# Patient Record
Sex: MALE | Race: WHITE | NOT HISPANIC OR LATINO | Employment: OTHER | URBAN - METROPOLITAN AREA
[De-identification: names, ages, dates, MRNs, and addresses within clinical notes are randomized per-mention and may not be internally consistent; named-entity substitution may affect disease eponyms.]

---

## 2017-08-10 RX ORDER — LOVASTATIN 40 MG/1
TABLET ORAL
Qty: 30 TABLET | Refills: 0 | Status: SHIPPED | OUTPATIENT
Start: 2017-08-10 | End: 2019-01-14

## 2017-08-20 DIAGNOSIS — G43.909 MIGRAINE WITHOUT STATUS MIGRAINOSUS, NOT INTRACTABLE, UNSPECIFIED MIGRAINE TYPE: Primary | ICD-10-CM

## 2017-08-21 RX ORDER — SUMATRIPTAN 100 MG/1
TABLET, FILM COATED ORAL
Qty: 15 TABLET | Refills: 1 | Status: SHIPPED | OUTPATIENT
Start: 2017-08-21 | End: 2018-01-31 | Stop reason: SDUPTHER

## 2017-09-18 RX ORDER — LOVASTATIN 40 MG/1
TABLET ORAL
Qty: 30 TABLET | Refills: 0 | OUTPATIENT
Start: 2017-09-18

## 2018-01-31 DIAGNOSIS — G43.909 MIGRAINE WITHOUT STATUS MIGRAINOSUS, NOT INTRACTABLE, UNSPECIFIED MIGRAINE TYPE: ICD-10-CM

## 2018-01-31 RX ORDER — SUMATRIPTAN 100 MG/1
100 TABLET, FILM COATED ORAL ONCE AS NEEDED
Qty: 9 TABLET | Refills: 2 | Status: SHIPPED | OUTPATIENT
Start: 2018-01-31 | End: 2018-09-12 | Stop reason: SDUPTHER

## 2018-01-31 NOTE — TELEPHONE ENCOUNTER
Fax received from [facility] stating Insurance will only cover 9 tab, med resent for 9 tab w 2 refills

## 2018-03-27 ENCOUNTER — OFFICE VISIT (OUTPATIENT)
Dept: ORTHOPEDIC SURGERY | Facility: CLINIC | Age: 68
End: 2018-03-27

## 2018-03-27 VITALS — TEMPERATURE: 98.1 F | BODY MASS INDEX: 27.77 KG/M2 | HEIGHT: 72 IN | WEIGHT: 205 LBS

## 2018-03-27 DIAGNOSIS — IMO0002 BURSITIS/TENDONITIS, SHOULDER: ICD-10-CM

## 2018-03-27 DIAGNOSIS — M25.511 RIGHT SHOULDER PAIN, UNSPECIFIED CHRONICITY: Primary | ICD-10-CM

## 2018-03-27 PROCEDURE — 73030 X-RAY EXAM OF SHOULDER: CPT | Performed by: ORTHOPAEDIC SURGERY

## 2018-03-27 PROCEDURE — 99203 OFFICE O/P NEW LOW 30 MIN: CPT | Performed by: ORTHOPAEDIC SURGERY

## 2018-03-27 RX ORDER — ETODOLAC 400 MG/1
400 TABLET, FILM COATED ORAL 2 TIMES DAILY PRN
Qty: 60 TABLET | Refills: 0 | Status: SHIPPED | OUTPATIENT
Start: 2018-03-27 | End: 2019-01-14

## 2018-03-30 ENCOUNTER — TELEPHONE (OUTPATIENT)
Dept: ORTHOPEDIC SURGERY | Facility: CLINIC | Age: 68
End: 2018-03-30

## 2018-04-02 PROBLEM — IMO0002 BURSITIS/TENDONITIS, SHOULDER: Status: ACTIVE | Noted: 2018-04-02

## 2018-09-02 DIAGNOSIS — G43.909 MIGRAINE WITHOUT STATUS MIGRAINOSUS, NOT INTRACTABLE, UNSPECIFIED MIGRAINE TYPE: ICD-10-CM

## 2018-09-04 ENCOUNTER — TELEPHONE (OUTPATIENT)
Dept: INTERNAL MEDICINE | Facility: CLINIC | Age: 68
End: 2018-09-04

## 2018-09-04 RX ORDER — SUMATRIPTAN 100 MG/1
TABLET, FILM COATED ORAL
Qty: 9 TABLET | Refills: 2 | OUTPATIENT
Start: 2018-09-04

## 2018-09-04 NOTE — TELEPHONE ENCOUNTER
Called and left voicemail informing patient we hasn't been seen in the office since 2016 so we couldn't fill his Imitrex at this time.

## 2018-09-04 NOTE — TELEPHONE ENCOUNTER
----- Message from Kristal Stewart sent at 9/4/2018 11:38 AM EDT -----  Contact: pt  Pt is calling for a refill     FOR  SUMAtriptan (IMITREX) 100 MG tablet      Patient requests RX SENT TO   [address] [facility] [address] [facility] [phone] [facility] [phone] FX      Caller#     Please and thank you.

## 2018-09-12 DIAGNOSIS — G43.909 MIGRAINE WITHOUT STATUS MIGRAINOSUS, NOT INTRACTABLE, UNSPECIFIED MIGRAINE TYPE: ICD-10-CM

## 2018-09-12 RX ORDER — SUMATRIPTAN 100 MG/1
100 TABLET, FILM COATED ORAL ONCE AS NEEDED
Qty: 9 TABLET | Refills: 4 | Status: SHIPPED | OUTPATIENT
Start: 2018-09-12

## 2019-01-14 ENCOUNTER — OFFICE VISIT (OUTPATIENT)
Dept: INTERNAL MEDICINE | Facility: CLINIC | Age: 69
End: 2019-01-14

## 2019-01-14 VITALS
OXYGEN SATURATION: 93 % | DIASTOLIC BLOOD PRESSURE: 68 MMHG | HEART RATE: 62 BPM | BODY MASS INDEX: 28.12 KG/M2 | SYSTOLIC BLOOD PRESSURE: 103 MMHG | HEIGHT: 72 IN | WEIGHT: 207.6 LBS | TEMPERATURE: 98.9 F

## 2019-01-14 DIAGNOSIS — N13.8 BENIGN PROSTATIC HYPERPLASIA WITH URINARY OBSTRUCTION: ICD-10-CM

## 2019-01-14 DIAGNOSIS — R68.89 HEAT INTOLERANCE: ICD-10-CM

## 2019-01-14 DIAGNOSIS — E78.00 ELEVATED CHOLESTEROL: ICD-10-CM

## 2019-01-14 DIAGNOSIS — N40.1 BENIGN PROSTATIC HYPERPLASIA WITH URINARY OBSTRUCTION: ICD-10-CM

## 2019-01-14 DIAGNOSIS — G43.009 MIGRAINE WITHOUT AURA AND WITHOUT STATUS MIGRAINOSUS, NOT INTRACTABLE: Primary | ICD-10-CM

## 2019-01-14 DIAGNOSIS — J38.1 POLYP OF VOCAL CORD: ICD-10-CM

## 2019-01-14 PROCEDURE — 99214 OFFICE O/P EST MOD 30 MIN: CPT | Performed by: FAMILY MEDICINE

## 2019-01-14 RX ORDER — LOVASTATIN 40 MG/1
40 TABLET ORAL DAILY
Qty: 90 TABLET | Refills: 3 | Status: SHIPPED | OUTPATIENT
Start: 2019-01-14 | End: 2020-01-15

## 2019-01-14 RX ORDER — UBIDECARENONE 100 MG
100 CAPSULE ORAL DAILY
COMMUNITY
End: 2019-08-14

## 2019-01-14 RX ORDER — MAGNESIUM GLUCONATE 27 MG(500)
27 TABLET ORAL 2 TIMES DAILY
COMMUNITY

## 2019-01-14 NOTE — PROGRESS NOTES
Subjective   Heriberto Macario Jr. is a 68 y.o. male.     Chief Complaint   Patient presents with   • Hyperlipidemia   • Headache   Vocal cord polyp      History of Present Illness   Delightful gentleman with a history of treatment of hyperlipidemia with lovastatin.  He's been out of his medicine for a while.  Otherwise is a question of prostate symptoms and we discussed getting a PSA.  He has had a history of vocal cord polyp seen by ENT in the past.  He is a former smoker.  We discussed getting labs today.      The following portions of the patient's history were reviewed and updated as appropriate: allergies, current medications, past social history and problem list.    Review of Systems   Constitutional: Negative.    HENT: Negative.    Eyes: Negative.    Respiratory: Negative.    Cardiovascular: Negative.    Gastrointestinal: Negative.    Endocrine: Negative.    Genitourinary: Negative.    Musculoskeletal: Negative.    Skin: Negative.    Allergic/Immunologic: Negative.    Neurological: Negative.    Hematological: Negative.    Psychiatric/Behavioral: Negative.        Objective   Vitals:    01/14/19 1119   BP: 103/68   Pulse: 62   Temp: 98.9 °F (37.2 °C)   SpO2: 93%     Physical Exam   Constitutional: He is oriented to person, place, and time. He appears well-developed and well-nourished.   HENT:   Head: Normocephalic and atraumatic.   Right Ear: Tympanic membrane and external ear normal.   Left Ear: Tympanic membrane and external ear normal.   Nose: Nose normal.   Mouth/Throat: Oropharynx is clear and moist.   Chronic hoarseness   Eyes: Conjunctivae and EOM are normal. Pupils are equal, round, and reactive to light.   Neck: Normal range of motion. Neck supple. No JVD present. No thyromegaly present.   Cardiovascular: Normal rate, regular rhythm, normal heart sounds and intact distal pulses.   Pulmonary/Chest: Effort normal and breath sounds normal.   Abdominal: Soft. Bowel sounds are normal.   Musculoskeletal: Normal  range of motion.   Lymphadenopathy:     He has no cervical adenopathy.   Neurological: He is alert and oriented to person, place, and time. No cranial nerve deficit. Coordination normal.   Skin: Skin is warm and dry. No rash noted.   Psychiatric: He has a normal mood and affect. His behavior is normal. Judgment and thought content normal.   Vitals reviewed.      Assessment/Plan   Problem List Items Addressed This Visit        Cardiovascular and Mediastinum    Elevated cholesterol    Relevant Medications    lovastatin (MEVACOR) 40 MG tablet    Other Relevant Orders    CBC & Differential    Comprehensive Metabolic Panel    Lipid Panel With / Chol / HDL Ratio    Urinalysis With Microscopic If Indicated (No Culture) - Urine, Clean Catch    TSH    T4, Free    CBC & Differential    Comprehensive Metabolic Panel    Lipid Panel With / Chol / HDL Ratio    Urinalysis With Microscopic If Indicated (No Culture) - Urine, Clean Catch    Migraine without status migrainosus, not intractable - Primary    Relevant Orders    CBC & Differential    Comprehensive Metabolic Panel    Lipid Panel With / Chol / HDL Ratio    Urinalysis With Microscopic If Indicated (No Culture) - Urine, Clean Catch    TSH    T4, Free    CBC & Differential    Comprehensive Metabolic Panel    Lipid Panel With / Chol / HDL Ratio    Urinalysis With Microscopic If Indicated (No Culture) - Urine, Clean Catch       Respiratory    Polyp of vocal cord    Relevant Orders    CBC & Differential    Comprehensive Metabolic Panel    Lipid Panel With / Chol / HDL Ratio    Urinalysis With Microscopic If Indicated (No Culture) - Urine, Clean Catch    TSH    T4, Free       Genitourinary    Benign prostatic hyperplasia with urinary obstruction    Relevant Orders    CBC & Differential    Comprehensive Metabolic Panel    Lipid Panel With / Chol / HDL Ratio    PSA, Total & Free    Urinalysis With Microscopic If Indicated (No Culture) - Urine, Clean Catch    TSH    T4, Free       Other Visit Diagnoses     Heat intolerance        Relevant Orders    CBC & Differential    Comprehensive Metabolic Panel    Lipid Panel With / Chol / HDL Ratio    Urinalysis With Microscopic If Indicated (No Culture) - Urine, Clean Catch    TSH    T4, Free    CBC & Differential    Comprehensive Metabolic Panel    Lipid Panel With / Chol / HDL Ratio    Urinalysis With Microscopic If Indicated (No Culture) - Urine, Clean Catch      Plan: Screening labs including PSA.  Follow-up in about 6 months for wellness exam.  Restart lovastatin 40 mg daily.  He sees neurology for migraine headaches.  Consider referral to ENT for vocal cord polyp.

## 2019-01-15 LAB
ALBUMIN SERPL-MCNC: 4.8 G/DL (ref 3.5–5.2)
ALBUMIN/GLOB SERPL: 1.8 G/DL
ALP SERPL-CCNC: 59 U/L (ref 39–117)
ALT SERPL-CCNC: 21 U/L (ref 1–41)
APPEARANCE UR: CLEAR
AST SERPL-CCNC: 21 U/L (ref 1–40)
BACTERIA #/AREA URNS HPF: ABNORMAL /HPF
BASOPHILS # BLD AUTO: 0.05 10*3/MM3 (ref 0–0.2)
BASOPHILS NFR BLD AUTO: 0.6 % (ref 0–1.5)
BILIRUB SERPL-MCNC: 0.4 MG/DL (ref 0.1–1.2)
BILIRUB UR QL STRIP: NEGATIVE
BUN SERPL-MCNC: 13 MG/DL (ref 8–23)
BUN/CREAT SERPL: 14.3 (ref 7–25)
CALCIUM SERPL-MCNC: 9.9 MG/DL (ref 8.6–10.5)
CASTS URNS MICRO: ABNORMAL
CHLORIDE SERPL-SCNC: 101 MMOL/L (ref 98–107)
CHOLEST SERPL-MCNC: 257 MG/DL (ref 0–200)
CHOLEST/HDLC SERPL: 5.35 {RATIO}
CO2 SERPL-SCNC: 27.7 MMOL/L (ref 22–29)
COLOR UR: ABNORMAL
CREAT SERPL-MCNC: 0.91 MG/DL (ref 0.76–1.27)
EOSINOPHIL # BLD AUTO: 0.51 10*3/MM3 (ref 0–0.7)
EOSINOPHIL NFR BLD AUTO: 6.2 % (ref 0.3–6.2)
EPI CELLS #/AREA URNS HPF: ABNORMAL /HPF
ERYTHROCYTE [DISTWIDTH] IN BLOOD BY AUTOMATED COUNT: 13.7 % (ref 11.5–14.5)
GLOBULIN SER CALC-MCNC: 2.7 GM/DL
GLUCOSE SERPL-MCNC: 89 MG/DL (ref 65–99)
GLUCOSE UR QL: NEGATIVE
HCT VFR BLD AUTO: 49.8 % (ref 40.4–52.2)
HDLC SERPL-MCNC: 48 MG/DL (ref 40–60)
HGB BLD-MCNC: 15.4 G/DL (ref 13.7–17.6)
HGB UR QL STRIP: NEGATIVE
IMM GRANULOCYTES # BLD AUTO: 0 10*3/MM3 (ref 0–0.03)
IMM GRANULOCYTES NFR BLD AUTO: 0 % (ref 0–0.5)
KETONES UR QL STRIP: ABNORMAL
LDLC SERPL CALC-MCNC: 164 MG/DL (ref 0–100)
LEUKOCYTE ESTERASE UR QL STRIP: ABNORMAL
LYMPHOCYTES # BLD AUTO: 2.8 10*3/MM3 (ref 0.9–4.8)
LYMPHOCYTES NFR BLD AUTO: 34.1 % (ref 19.6–45.3)
MCH RBC QN AUTO: 30.6 PG (ref 27–32.7)
MCHC RBC AUTO-ENTMCNC: 30.9 G/DL (ref 32.6–36.4)
MCV RBC AUTO: 98.8 FL (ref 79.8–96.2)
MONOCYTES # BLD AUTO: 0.6 10*3/MM3 (ref 0.2–1.2)
MONOCYTES NFR BLD AUTO: 7.3 % (ref 5–12)
NEUTROPHILS # BLD AUTO: 4.24 10*3/MM3 (ref 1.9–8.1)
NEUTROPHILS NFR BLD AUTO: 51.8 % (ref 42.7–76)
NITRITE UR QL STRIP: NEGATIVE
PH UR STRIP: 6 [PH] (ref 5–8)
PLATELET # BLD AUTO: 324 10*3/MM3 (ref 140–500)
POTASSIUM SERPL-SCNC: 5.1 MMOL/L (ref 3.5–5.2)
PROT SERPL-MCNC: 7.5 G/DL (ref 6–8.5)
PROT UR QL STRIP: NEGATIVE
PSA FREE MFR SERPL: 20.8 %
PSA FREE SERPL-MCNC: 0.27 NG/ML
PSA SERPL-MCNC: 1.3 NG/ML (ref 0–4)
RBC # BLD AUTO: 5.04 10*6/MM3 (ref 4.6–6)
RBC #/AREA URNS HPF: ABNORMAL /HPF
SODIUM SERPL-SCNC: 142 MMOL/L (ref 136–145)
SP GR UR: 1.03 (ref 1–1.03)
T4 FREE SERPL-MCNC: 1.08 NG/DL (ref 0.93–1.7)
TRIGL SERPL-MCNC: 225 MG/DL (ref 0–150)
TSH SERPL DL<=0.005 MIU/L-ACNC: 2.17 MIU/ML (ref 0.27–4.2)
UROBILINOGEN UR STRIP-MCNC: ABNORMAL MG/DL
VLDLC SERPL CALC-MCNC: 45 MG/DL (ref 5–40)
WBC # BLD AUTO: 8.2 10*3/MM3 (ref 4.5–10.7)
WBC #/AREA URNS HPF: ABNORMAL /HPF

## 2019-03-20 LAB
APPEARANCE UR: CLEAR
BILIRUB UR QL STRIP: NEGATIVE
COLOR UR: YELLOW
GLUCOSE UR QL: NEGATIVE
HGB UR QL STRIP: NEGATIVE
KETONES UR QL STRIP: NEGATIVE
LEUKOCYTE ESTERASE UR QL STRIP: NEGATIVE
NITRITE UR QL STRIP: NEGATIVE
PH UR STRIP: 5.5 [PH] (ref 5–8)
PROT UR QL STRIP: NEGATIVE
SP GR UR: 1.02 (ref 1–1.03)
UROBILINOGEN UR STRIP-MCNC: NORMAL MG/DL

## 2019-05-09 ENCOUNTER — TELEPHONE (OUTPATIENT)
Dept: INTERNAL MEDICINE | Facility: CLINIC | Age: 69
End: 2019-05-09

## 2019-05-09 DIAGNOSIS — M54.40 LOW BACK PAIN WITH SCIATICA, SCIATICA LATERALITY UNSPECIFIED, UNSPECIFIED BACK PAIN LATERALITY, UNSPECIFIED CHRONICITY: Primary | ICD-10-CM

## 2019-05-09 NOTE — TELEPHONE ENCOUNTER
The pt contacted them requesting PT for low back and sciatica pain  They need an order sent and an insurance referral  Order placed

## 2019-07-14 ENCOUNTER — RESULTS ENCOUNTER (OUTPATIENT)
Dept: INTERNAL MEDICINE | Facility: CLINIC | Age: 69
End: 2019-07-14

## 2019-07-14 DIAGNOSIS — R68.89 HEAT INTOLERANCE: ICD-10-CM

## 2019-07-14 DIAGNOSIS — E78.00 ELEVATED CHOLESTEROL: ICD-10-CM

## 2019-07-14 DIAGNOSIS — G43.009 MIGRAINE WITHOUT AURA AND WITHOUT STATUS MIGRAINOSUS, NOT INTRACTABLE: ICD-10-CM

## 2019-08-14 ENCOUNTER — OFFICE VISIT (OUTPATIENT)
Dept: INTERNAL MEDICINE | Facility: CLINIC | Age: 69
End: 2019-08-14

## 2019-08-14 VITALS
DIASTOLIC BLOOD PRESSURE: 75 MMHG | TEMPERATURE: 97.7 F | HEART RATE: 79 BPM | BODY MASS INDEX: 27.5 KG/M2 | HEIGHT: 72 IN | WEIGHT: 203 LBS | SYSTOLIC BLOOD PRESSURE: 116 MMHG | OXYGEN SATURATION: 95 %

## 2019-08-14 DIAGNOSIS — G43.009 MIGRAINE WITHOUT AURA AND WITHOUT STATUS MIGRAINOSUS, NOT INTRACTABLE: ICD-10-CM

## 2019-08-14 DIAGNOSIS — N13.8 BENIGN PROSTATIC HYPERPLASIA WITH URINARY OBSTRUCTION: ICD-10-CM

## 2019-08-14 DIAGNOSIS — N40.1 BENIGN PROSTATIC HYPERPLASIA WITH URINARY OBSTRUCTION: ICD-10-CM

## 2019-08-14 DIAGNOSIS — E78.00 ELEVATED CHOLESTEROL: Primary | ICD-10-CM

## 2019-08-14 LAB
ALBUMIN SERPL-MCNC: 4.8 G/DL (ref 3.5–5.2)
ALBUMIN/GLOB SERPL: 1.9 G/DL
ALP SERPL-CCNC: 71 U/L (ref 39–117)
ALT SERPL-CCNC: 27 U/L (ref 1–41)
AST SERPL-CCNC: 21 U/L (ref 1–40)
BILIRUB SERPL-MCNC: 0.4 MG/DL (ref 0.2–1.2)
BUN SERPL-MCNC: 11 MG/DL (ref 8–23)
BUN/CREAT SERPL: 12.4 (ref 7–25)
CALCIUM SERPL-MCNC: 9.8 MG/DL (ref 8.6–10.5)
CHLORIDE SERPL-SCNC: 101 MMOL/L (ref 98–107)
CHOLEST SERPL-MCNC: 196 MG/DL (ref 0–200)
CO2 SERPL-SCNC: 26.2 MMOL/L (ref 22–29)
CREAT SERPL-MCNC: 0.89 MG/DL (ref 0.76–1.27)
ERYTHROCYTE [DISTWIDTH] IN BLOOD BY AUTOMATED COUNT: 13 % (ref 12.3–15.4)
GLOBULIN SER CALC-MCNC: 2.5 GM/DL
GLUCOSE SERPL-MCNC: 120 MG/DL (ref 65–99)
HCT VFR BLD AUTO: 49 % (ref 37.5–51)
HDLC SERPL-MCNC: 51 MG/DL (ref 40–60)
HGB BLD-MCNC: 15.7 G/DL (ref 13–17.7)
LDLC SERPL CALC-MCNC: 95 MG/DL (ref 0–100)
LDLC/HDLC SERPL: 1.87 {RATIO}
MCH RBC QN AUTO: 30.4 PG (ref 26.6–33)
MCHC RBC AUTO-ENTMCNC: 32 G/DL (ref 31.5–35.7)
MCV RBC AUTO: 95 FL (ref 79–97)
PLATELET # BLD AUTO: 321 10*3/MM3 (ref 140–450)
POTASSIUM SERPL-SCNC: 4.8 MMOL/L (ref 3.5–5.2)
PROT SERPL-MCNC: 7.3 G/DL (ref 6–8.5)
PSA SERPL-MCNC: 0.85 NG/ML (ref 0–4)
RBC # BLD AUTO: 5.16 10*6/MM3 (ref 4.14–5.8)
SODIUM SERPL-SCNC: 141 MMOL/L (ref 136–145)
TRIGL SERPL-MCNC: 248 MG/DL (ref 0–150)
VLDLC SERPL CALC-MCNC: 49.6 MG/DL
WBC # BLD AUTO: 7.89 10*3/MM3 (ref 3.4–10.8)

## 2019-08-14 PROCEDURE — G0439 PPPS, SUBSEQ VISIT: HCPCS | Performed by: NURSE PRACTITIONER

## 2019-08-14 NOTE — PATIENT INSTRUCTIONS
Medicare Wellness  Personal Prevention Plan of Service     Date of Office Visit:  2019  Encounter Provider:  Jimbo Preston III, NP-C  Place of Service:  [facility] INTERNAL MEDICINE  Patient Name: Heriberto Macario Jr.  :  1950    As part of the Medicare Wellness portion of your visit today, we are providing you with this personalized preventive plan of services (PPPS). This plan is based upon recommendations of the [place] Preventive Services Task Force (USPSTF) and the Advisory Committee on Immunization Practices (ACIP).    This lists the preventive care services that should be considered, and provides dates of when you are due. Items listed as completed are up-to-date and do not require any further intervention.    Health Maintenance   Topic Date Due   • TDAP/TD VACCINES (1 - Tdap) 1968   • ZOSTER VACCINE (1 of 2) 1999   • PNEUMOCOCCAL VACCINES (65+ LOW/MEDIUM RISK) (1 of 2 - PCV13) 2014   • HEPATITIS C SCREENING  2016   • MEDICARE ANNUAL WELLNESS  2016   • COLONOSCOPY  2016   • AAA SCREEN (ONE-TIME)  2016   • INFLUENZA VACCINE  2019   • LIPID PANEL  2020       Orders Placed This Encounter   Procedures   • Comprehensive Metabolic Panel   • Lipid Panel With LDL / HDL Ratio   • CBC (No Diff)   • PSA DIAGNOSTIC ONLY       Return in about 1 year (around 2020).

## 2019-08-14 NOTE — PROGRESS NOTES
The ABCs of the Annual Wellness Visit  Subsequent Medicare Wellness Visit    Chief Complaint   Patient presents with   • Medicare Wellness-subsequent     c/c pt states here for annual check up     AWV    Subjective   History of Present Illness:  Heriberto Macario Jr. is a 69 y.o. male who presents for a Subsequent Medicare Wellness Visit.    States doing well.  Still has BPH sx but does not want rx.   Plans to finally have vocal cord polyps removed.  He has delayed this for 14 years.   Had some AK removed from head at dermatologists.   Sees Dr Cedeno for Migraines and doing well.   Some depression but does not want rx.  States related to taking care of parent.       HEALTH RISK ASSESSMENT    Recent Hospitalizations:  No hospitalization(s) within the last year.    Current Medical Providers:  Patient Care Team:  Angel Trevino Jr., MD as PCP - General (Family Medicine)  Kennedy Cedeno DO as Consulting Physician (Neurology)    Smoking Status:  Social History     Tobacco Use   Smoking Status Former Smoker   Smokeless Tobacco Never Used       Alcohol Consumption:  Social History     Substance and Sexual Activity   Alcohol Use Yes       Depression Screen:   PHQ-2/PHQ-9 Depression Screening 8/14/2019   Little interest or pleasure in doing things 3   Feeling down, depressed, or hopeless 3   Trouble falling or staying asleep, or sleeping too much 0   Feeling tired or having little energy 1   Poor appetite or overeating 0   Feeling bad about yourself - or that you are a failure or have let yourself or your family down 0   Trouble concentrating on things, such as reading the newspaper or watching television 1   Moving or speaking so slowly that other people could have noticed. Or the opposite - being so fidgety or restless that you have been moving around a lot more than usual 0   Thoughts that you would be better off dead, or of hurting yourself in some way 0   Total Score 8   If you checked off any problems, how difficult  have these problems made it for you to do your work, take care of things at home, or get along with other people? Somewhat difficult       Fall Risk Screen:  MARYLOU Fall Risk Assessment has not been completed.    Health Habits and Functional and Cognitive Screening:  Functional & Cognitive Status 8/14/2019   Do you have difficulty preparing food and eating? No   Do you have difficulty bathing yourself, getting dressed or grooming yourself? No   Do you have difficulty using the toilet? No   Do you have difficulty moving around from place to place? No   Do you have trouble with steps or getting out of a bed or a chair? No   Current Diet Well Balanced Diet   Dental Exam Up to date   Eye Exam Not up to date   Exercise (times per week) 1 times per week   Current Exercise Activities Include Walking   Do you need help using the phone?  No   Are you deaf or do you have serious difficulty hearing?  Yes   Do you need help with transportation? No   Do you need help shopping? No   Do you need help preparing meals?  No   Do you need help with housework?  No   Do you need help with laundry? No   Do you need help taking your medications? No   Do you need help managing money? No   Do you ever drive or ride in a car without wearing a seat belt? No   Have you felt unusual stress, anger or loneliness in the last month? No   Who do you live with? Alone   If you need help, do you have trouble finding someone available to you? Yes   Have you been bothered in the last four weeks by sexual problems? No   Do you have difficulty concentrating, remembering or making decisions? No         Does the patient have evidence of cognitive impairment? No    Asprin use counseling:Does not need ASA (and currently is not on it)    Age-appropriate Screening Schedule:  Refer to the list below for future screening recommendations based on patient's age, sex and/or medical conditions. Orders for these recommended tests are listed in the plan section. The  patient has been provided with a written plan.    Health Maintenance   Topic Date Due   • TDAP/TD VACCINES (1 - Tdap) 07/26/1968   • ZOSTER VACCINE (1 of 2) 07/26/1999   • PNEUMOCOCCAL VACCINES (65+ LOW/MEDIUM RISK) (1 of 2 - PCV13) 07/26/2014   • COLONOSCOPY  03/29/2016   • INFLUENZA VACCINE  08/01/2019   • LIPID PANEL  01/14/2020          The following portions of the patient's history were reviewed and updated as appropriate: allergies, current medications, past family history, past medical history, past social history, past surgical history and problem list.    Outpatient Medications Prior to Visit   Medication Sig Dispense Refill   • lovastatin (MEVACOR) 40 MG tablet Take 1 tablet by mouth Daily. 90 tablet 3   • magnesium gluconate (MAGONATE) 500 MG tablet Take 27 mg by mouth 2 (Two) Times a Day.     • SUMAtriptan (IMITREX) 100 MG tablet Take one tablet at onset of headache. May repeat dose one time in 2 hours if headache not relieved. 9 tablet 4   • aspirin 81 MG tablet Take  by mouth daily.     • coenzyme Q10 100 MG capsule Take 100 mg by mouth Daily.       No facility-administered medications prior to visit.        Patient Active Problem List   Diagnosis   • Polyp of vocal cord   • Elevated cholesterol   • Benign prostatic hyperplasia with urinary obstruction   • Migraine without status migrainosus, not intractable   • Pain in lateral right lower extremity   • Bursitis/tendonitis, shoulder       Advanced Care Planning:  Patient has an advance directive - a copy has not been provided. Have asked the patient to send this to us to add to record    Review of Systems   Constitutional: Negative for unexpected weight change.   Respiratory: Negative for cough.    Cardiovascular: Negative for chest pain, palpitations and leg swelling.   Gastrointestinal: Negative for blood in stool.   Genitourinary: Positive for difficulty urinating. Negative for flank pain.       Compared to one year ago, the patient feels his  "physical health is the same.  Compared to one year ago, the patient feels his mental health is the same.    Reviewed chart for potential of high risk medication in the elderly: yes  Reviewed chart for potential of harmful drug interactions in the elderly:yes    Objective         Vitals:    08/14/19 0959   BP: 116/75   BP Location: Right arm   Patient Position: Sitting   Cuff Size: Small Adult   Pulse: 79   Temp: 97.7 °F (36.5 °C)   TempSrc: Oral   SpO2: 95%   Weight: 92.1 kg (203 lb)   Height: 182.9 cm (72\")   PainSc: 0-No pain       Body mass index is 27.53 kg/m².  Discussed the patient's BMI with him. The BMI is in the acceptable range.    Physical Exam   Constitutional: He is oriented to person, place, and time. He appears well-developed. He is cooperative.   HENT:   Hoarse voice quality    Neck: No thyromegaly present.   Cardiovascular: Normal rate, regular rhythm, normal heart sounds, intact distal pulses and normal pulses.   Pulmonary/Chest: Effort normal and breath sounds normal. He exhibits no deformity.   Equal, Unlabored   Abdominal: Soft. Bowel sounds are normal.   Musculoskeletal: Normal range of motion. He exhibits no edema.   Neurological: He is alert and oriented to person, place, and time.   Skin: Capillary refill takes 2 to 3 seconds.   Psychiatric: He has a normal mood and affect.   Vitals reviewed.            Assessment/Plan   Medicare Risks and Personalized Health Plan  CMS Preventative Services Quick Reference  Advance Directive Discussion  Immunizations Discussed/Encouraged (specific immunizations; Influenza )    The above risks/problems have been discussed with the patient.  Pertinent information has been shared with the patient in the After Visit Summary.  Follow up plans and orders are seen below in the Assessment/Plan Section.    Diagnoses and all orders for this visit:    1. Elevated cholesterol (Primary)  -     Comprehensive Metabolic Panel  -     Lipid Panel With LDL / HDL Ratio  -     " CBC (No Diff)    2. Migraine without aura and without status migrainosus, not intractable  -     Comprehensive Metabolic Panel  -     Lipid Panel With LDL / HDL Ratio  -     CBC (No Diff)    3. Benign prostatic hyperplasia with urinary obstruction  -     Comprehensive Metabolic Panel  -     Lipid Panel With LDL / HDL Ratio  -     CBC (No Diff)  -     PSA DIAGNOSTIC ONLY      Follow Up:  Return in about 1 year (around 8/14/2020).     An After Visit Summary and PPPS were given to the patient.

## 2019-08-15 NOTE — PROGRESS NOTES
Notify patient his lab work looks good.  Bad cholesterol (LDL) improved from 164 down to 95.   PSA is lower than previous.  Now at 0.849  No med changes

## 2019-11-08 ENCOUNTER — OFFICE VISIT (OUTPATIENT)
Dept: INTERNAL MEDICINE | Facility: CLINIC | Age: 69
End: 2019-11-08

## 2019-11-08 VITALS
WEIGHT: 208.2 LBS | HEIGHT: 72 IN | HEART RATE: 79 BPM | TEMPERATURE: 98.2 F | SYSTOLIC BLOOD PRESSURE: 127 MMHG | BODY MASS INDEX: 28.2 KG/M2 | RESPIRATION RATE: 16 BRPM | OXYGEN SATURATION: 91 % | DIASTOLIC BLOOD PRESSURE: 79 MMHG

## 2019-11-08 DIAGNOSIS — K21.9 GASTROESOPHAGEAL REFLUX DISEASE, ESOPHAGITIS PRESENCE NOT SPECIFIED: ICD-10-CM

## 2019-11-08 DIAGNOSIS — R07.89 CHEST DISCOMFORT: Primary | ICD-10-CM

## 2019-11-08 DIAGNOSIS — I45.10 RIGHT BUNDLE BRANCH BLOCK: ICD-10-CM

## 2019-11-08 PROCEDURE — 99214 OFFICE O/P EST MOD 30 MIN: CPT | Performed by: NURSE PRACTITIONER

## 2019-11-08 PROCEDURE — 93000 ELECTROCARDIOGRAM COMPLETE: CPT | Performed by: NURSE PRACTITIONER

## 2019-11-08 RX ORDER — OMEPRAZOLE 20 MG/1
20 CAPSULE, DELAYED RELEASE ORAL DAILY
COMMUNITY
End: 2019-11-14 | Stop reason: ALTCHOICE

## 2019-11-08 NOTE — PROGRESS NOTES
Procedure     ECG 12 Lead  Date/Time: 11/8/2019 10:53 AM  Performed by: Nisa Gillespie APRN  Authorized by: Nisa Gillespie APRN   Comparison: not compared with previous ECG   Previous ECG: no previous ECG available  Rhythm: sinus rhythm  Conduction: right bundle branch block    Clinical impression: abnormal EKG

## 2019-11-08 NOTE — PROGRESS NOTES
"Raheem Macario Jr. is a 69 y.o. male.   CC:   Patient presents for evaluation of chest discomfort.  This is a 69-year-old male patient of Dr. baer with a history of hyperlipidemia, BPH and migraines.  He reports that about 6 times over the past 45 years he has had episodes of very intense chest discomfort/epigastric discomfort rated at a 7 out of 10.  The episodes last 45 hours each.  He reports that during his most recent episode last week he took multiple antacids and omeprazole, but the episodes still took about 5 hours to completely subside.  The episodes are random and not brought on by any particular foods or exertion.  He reports that he feels short of breath during the episodes \"only because of the intensity of the pain.\"  He reports that at times \"I have thought about driving myself to the emergency room.\"  He denies any visual changes or headaches during the episodes.  He does report that at age 37 he was hospitalized with pericarditis.  He denies any other cardiac history.  He does state that he has dealt with acid reflux since his 40s.  He reports that from day-to-day he does not struggle with this because he has cut out most trigger foods, does not eat for 4 to 5 hours before bed, always sits upright after meals and limits his coffee to one cup per day.  He denies any radiation to the left arm or back pain.  He denies any focal abdominal pain, nausea, vomiting, diarrhea, constipation.  He denies development of any other new issues today.         The following portions of the patient's history were reviewed and updated as appropriate: allergies, current medications, past family history, past medical history, past social history, past surgical history and problem list.    Review of Systems   Constitutional: Negative for activity change, chills, fatigue, fever, unexpected weight gain and unexpected weight loss.   HENT: Negative for congestion, hearing loss, postnasal drip, sinus pressure, " "sneezing, sore throat, swollen glands and tinnitus.    Eyes: Negative for photophobia, pain and visual disturbance.   Respiratory: Negative for cough, chest tightness, shortness of breath and wheezing.    Cardiovascular: Positive for chest pain. Negative for palpitations and leg swelling.   Gastrointestinal: Negative for abdominal distention, abdominal pain, constipation, diarrhea, nausea and vomiting.   Endocrine: Negative for polydipsia, polyphagia and polyuria.   Genitourinary: Negative for dysuria, frequency, hematuria and urgency.   Neurological: Negative for dizziness, weakness, numbness and headache.   All other systems reviewed and are negative.      Objective    /79 (BP Location: Left arm, Patient Position: Sitting, Cuff Size: Adult)   Pulse 79   Temp 98.2 °F (36.8 °C) (Oral)   Resp 16   Ht 182.9 cm (72\")   Wt 94.4 kg (208 lb 3.2 oz)   SpO2 91%   BMI 28.24 kg/m²     Physical Exam   Constitutional: He is oriented to person, place, and time. He appears well-developed and well-nourished. No distress.   HENT:   Head: Normocephalic and atraumatic.   Right Ear: External ear normal.   Left Ear: External ear normal.   Nose: Nose normal.   Mouth/Throat: Oropharynx is clear and moist.   Eyes: Pupils are equal, round, and reactive to light.   Neck: Normal range of motion. Neck supple. No JVD present. No tracheal deviation present. No thyromegaly present.   Cardiovascular: Normal rate, regular rhythm, normal heart sounds and intact distal pulses. Exam reveals no gallop and no friction rub.   No murmur heard.  Posterior tib pulses 2+ and equal bilaterally.  No peripheral edema.   Pulmonary/Chest: Effort normal and breath sounds normal. No stridor. No respiratory distress. He has no wheezes. He has no rales. He exhibits no tenderness.   Lungs are CTA bilaterally   Abdominal: Soft. Bowel sounds are normal. He exhibits no distension and no mass. There is no tenderness. There is no rebound and no guarding. No " hernia.   Bowel sounds active x4 quadrants.  No pain to light or deep palpation x4 quadrants.   Musculoskeletal: Normal range of motion.   Lymphadenopathy:     He has no cervical adenopathy.   Neurological: He is alert and oriented to person, place, and time.   Skin: Skin is warm and dry. Capillary refill takes less than 2 seconds. He is not diaphoretic.   Psychiatric: He has a normal mood and affect. His behavior is normal. Judgment and thought content normal.   Nursing note and vitals reviewed.    Current outpatient and discharge medications have been reconciled for the patient.  Reviewed by: YAKOV Patel      Assessment/Plan   Heriberto was seen today for gi problem.    Diagnoses and all orders for this visit:    Chest discomfort  -     Stress test with myocardial perfusion; Future  -     Ambulatory Referral to Cardiology  -     ECG 12 Lead    Gastroesophageal reflux disease, esophagitis presence not specified    Right bundle branch block  -     Stress test with myocardial perfusion; Future  -     Ambulatory Referral to Cardiology    -EKG performed today is showing right bundle branch block.  I believe that it is important that we rule out cardiac/ischemic cause due to the intensity and longevity of these episodes of chest discomfort.  It could be epigastric in origin but I believe we need to rule out a cardiac standpoint first.    -I have ordered a consult with cardiology as well as a stress test.    -If his symptoms turn out to be noncardiac in origin, we will possibly get a GI consult/work him up for peptic ulcer disease.  He will continue with omeprazole 20 mg daily.    -Follow-up if symptoms persist or worsen and routinely with PCP, Dr. baer.

## 2019-11-13 ENCOUNTER — HOSPITAL ENCOUNTER (OUTPATIENT)
Dept: CARDIOLOGY | Facility: HOSPITAL | Age: 69
Discharge: HOME | End: 2019-11-13
Admitting: NURSE PRACTITIONER

## 2019-11-13 DIAGNOSIS — I45.10 RIGHT BUNDLE BRANCH BLOCK: ICD-10-CM

## 2019-11-13 DIAGNOSIS — R07.89 CHEST DISCOMFORT: ICD-10-CM

## 2019-11-13 LAB
BH CV NUCLEAR PRIOR STUDY: 2
BH CV STRESS BP STAGE 1: NORMAL
BH CV STRESS BP STAGE 2: NORMAL
BH CV STRESS DURATION MIN STAGE 1: 3
BH CV STRESS DURATION MIN STAGE 2: 3
BH CV STRESS DURATION MIN STAGE 3: 1
BH CV STRESS DURATION SEC STAGE 1: 0
BH CV STRESS DURATION SEC STAGE 2: 0
BH CV STRESS DURATION SEC STAGE 3: 0
BH CV STRESS GRADE STAGE 1: 10
BH CV STRESS GRADE STAGE 2: 12
BH CV STRESS GRADE STAGE 3: 14
BH CV STRESS HR STAGE 1: 109
BH CV STRESS HR STAGE 2: 136
BH CV STRESS HR STAGE 3: 148
BH CV STRESS METS STAGE 1: 5
BH CV STRESS METS STAGE 2: 7.5
BH CV STRESS METS STAGE 3: 10
BH CV STRESS PROTOCOL 1: NORMAL
BH CV STRESS RECOVERY BP: NORMAL MMHG
BH CV STRESS RECOVERY HR: 98 BPM
BH CV STRESS SPEED STAGE 1: 1.7
BH CV STRESS SPEED STAGE 2: 2.5
BH CV STRESS SPEED STAGE 3: 3.4
BH CV STRESS STAGE 1: 1
BH CV STRESS STAGE 2: 2
BH CV STRESS STAGE 3: 3
LV EF NUC BP: 56 %
MAXIMAL PREDICTED HEART RATE: 150 BPM
PERCENT MAX PREDICTED HR: 98.67 %
STRESS BASELINE BP: NORMAL MMHG
STRESS BASELINE HR: 83 BPM
STRESS PERCENT HR: 116 %
STRESS POST ESTIMATED WORKLOAD: 8 METS
STRESS POST EXERCISE DUR MIN: 7 MIN
STRESS POST EXERCISE DUR SEC: 0 SEC
STRESS POST PEAK BP: NORMAL MMHG
STRESS POST PEAK HR: 148 BPM
STRESS TARGET HR: 128 BPM

## 2019-11-13 PROCEDURE — 93016 CV STRESS TEST SUPVJ ONLY: CPT | Performed by: INTERNAL MEDICINE

## 2019-11-13 PROCEDURE — 78452 HT MUSCLE IMAGE SPECT MULT: CPT | Performed by: INTERNAL MEDICINE

## 2019-11-13 PROCEDURE — 78452 HT MUSCLE IMAGE SPECT MULT: CPT

## 2019-11-13 PROCEDURE — 0 TECHNETIUM TETROFOSMIN KIT: Performed by: NURSE PRACTITIONER

## 2019-11-13 PROCEDURE — 93018 CV STRESS TEST I&R ONLY: CPT | Performed by: INTERNAL MEDICINE

## 2019-11-13 PROCEDURE — A9502 TC99M TETROFOSMIN: HCPCS | Performed by: NURSE PRACTITIONER

## 2019-11-13 PROCEDURE — 93017 CV STRESS TEST TRACING ONLY: CPT

## 2019-11-13 RX ADMIN — TETROFOSMIN 1 DOSE: 1.38 INJECTION, POWDER, LYOPHILIZED, FOR SOLUTION INTRAVENOUS at 13:02

## 2019-11-13 RX ADMIN — TETROFOSMIN 1 DOSE: 1.38 INJECTION, POWDER, LYOPHILIZED, FOR SOLUTION INTRAVENOUS at 12:01

## 2019-11-14 ENCOUNTER — TELEPHONE (OUTPATIENT)
Dept: INTERNAL MEDICINE | Facility: CLINIC | Age: 69
End: 2019-11-14

## 2019-11-14 DIAGNOSIS — K21.9 GASTROESOPHAGEAL REFLUX DISEASE, ESOPHAGITIS PRESENCE NOT SPECIFIED: Primary | ICD-10-CM

## 2019-11-14 RX ORDER — PANTOPRAZOLE SODIUM 40 MG/1
40 TABLET, DELAYED RELEASE ORAL DAILY
Qty: 90 TABLET | Refills: 1 | Status: SHIPPED | OUTPATIENT
Start: 2019-11-14 | End: 2020-04-10

## 2019-11-14 NOTE — TELEPHONE ENCOUNTER
----- Message from YAKOV Patel sent at 11/14/2019  8:34 AM EST -----  Please notify patient that I went over the results of his stress test and the results are good.  There is no evidence of heart damage and his heart is pumping up blood effectively to the rest of the body.  As we discussed in the office, I would like   to rule out reflux/GI abnormality as the cause of his episodes of discomfort.  Please order a referral to GI for further evaluation.  Please have him discontinue the omeprazole and order pantoprazole 40 mg daily to see if this helps as well.  Please   let me know of any questions or concerns.

## 2019-11-14 NOTE — PROGRESS NOTES
Please notify patient that I went over the results of his stress test and the results are good.  There is no evidence of heart damage and his heart is pumping up blood effectively to the rest of the body.  As we discussed in the office, I would like to rule out reflux/GI abnormality as the cause of his episodes of discomfort.  Please order a referral to GI for further evaluation.  Please have him discontinue the omeprazole and order pantoprazole 40 mg daily to see if this helps as well.  Please let me know of any questions or concerns.

## 2019-12-17 ENCOUNTER — OFFICE VISIT (OUTPATIENT)
Dept: GASTROENTEROLOGY | Facility: CLINIC | Age: 69
End: 2019-12-17

## 2019-12-17 VITALS
HEIGHT: 72 IN | SYSTOLIC BLOOD PRESSURE: 124 MMHG | BODY MASS INDEX: 28.63 KG/M2 | WEIGHT: 211.4 LBS | DIASTOLIC BLOOD PRESSURE: 82 MMHG | TEMPERATURE: 98.2 F

## 2019-12-17 DIAGNOSIS — Z12.11 ENCOUNTER FOR SCREENING FOR MALIGNANT NEOPLASM OF COLON: ICD-10-CM

## 2019-12-17 DIAGNOSIS — R07.9 CHEST PAIN, UNSPECIFIED TYPE: Primary | ICD-10-CM

## 2019-12-17 PROCEDURE — 99204 OFFICE O/P NEW MOD 45 MIN: CPT | Performed by: INTERNAL MEDICINE

## 2019-12-17 RX ORDER — SODIUM CHLORIDE, SODIUM LACTATE, POTASSIUM CHLORIDE, CALCIUM CHLORIDE 600; 310; 30; 20 MG/100ML; MG/100ML; MG/100ML; MG/100ML
30 INJECTION, SOLUTION INTRAVENOUS CONTINUOUS
Status: CANCELLED | OUTPATIENT
Start: 2020-01-14

## 2019-12-17 NOTE — PROGRESS NOTES
Chief Complaint   Patient presents with   • Heartburn   • Abdominal Pain     Subjective   HPI  Heriberto Macario Jr. is a 69 y.o. male who presents for evaluation of chest pain.    Precoridal.  Radiates across anterior chest.   Sharp and burning sensation.  Infrequent episodes over last 10 years  Last episode 2 mos ago.  No isuses with dysphagia.  Gets occasional heartburn at night if he eats too late.  No prior EGD or colonoscopy.   Also has longstanding hoarseness of voice.  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    Stress test 11/19  · Myocardial perfusion imaging indicates a normal myocardial perfusion study with no evidence of ischemia.  · Left ventricular ejection fraction is normal (Calculated EF = 56%).  · Impressions are consistent with a low risk study.      Past Medical History:   Diagnosis Date   • BPH (benign prostatic hyperplasia)    • Hyperlipidemia    • Migraine        Current Outpatient Medications:   •  lovastatin (MEVACOR) 40 MG tablet, Take 1 tablet by mouth Daily., Disp: 90 tablet, Rfl: 3  •  magnesium gluconate (MAGONATE) 500 MG tablet, Take 27 mg by mouth 2 (Two) Times a Day., Disp: , Rfl:   •  pantoprazole (PROTONIX) 40 MG EC tablet, Take 1 tablet by mouth Daily., Disp: 90 tablet, Rfl: 1  •  SUMAtriptan (IMITREX) 100 MG tablet, Take one tablet at onset of headache. May repeat dose one time in 2 hours if headache not relieved., Disp: 9 tablet, Rfl: 4  No Known Allergies  Social History     Socioeconomic History   • Marital status: Single     Spouse name: Not on file   • Number of children: Not on file   • Years of education: Not on file   • Highest education level: Not on file   Tobacco Use   • Smoking status: Former Smoker   • Smokeless tobacco: Never Used   Substance and Sexual Activity   • Alcohol use: Yes   • Drug use: No     Family History   Problem Relation Age of Onset   •  Colon cancer Paternal Uncle      Review of Systems   Cardiovascular: Positive for chest pain.   Gastrointestinal:        Heartburn     All other systems reviewed and are negative.    Objective   Vitals:    12/17/19 1548   BP: 124/82   Temp: 98.2 °F (36.8 °C)     Physical Exam   Constitutional: He is oriented to person, place, and time. He appears well-developed and well-nourished.   HENT:   Head: Normocephalic and atraumatic.   Mouth/Throat: Oropharynx is clear and moist.   Eyes: Conjunctivae are normal. No scleral icterus.   Neck: Normal range of motion. Neck supple. No thyromegaly present.   Cardiovascular: Normal rate and regular rhythm. Exam reveals no friction rub.   No murmur heard.  Pulmonary/Chest: Effort normal and breath sounds normal. No respiratory distress. He has no wheezes. He has no rales. He exhibits no tenderness.   Abdominal: Soft. Bowel sounds are normal. He exhibits no distension and no mass. There is no tenderness. There is no rebound and no guarding. No hernia.   Musculoskeletal: He exhibits no edema.   Lymphadenopathy:     He has no cervical adenopathy.     He has no axillary adenopathy.   Neurological: He is alert and oriented to person, place, and time.   Skin: Skin is warm and dry. No rash noted.   Psychiatric: He has a normal mood and affect. His behavior is normal. Judgment and thought content normal.   Vitals reviewed.    Assessment/Plan   Assessment:     1. Chest pain, unspecified type    2. Encounter for screening for malignant neoplasm of colon      Plan:   EGD will be scheduled to evaluate the patient's long standing precordial pain  We will perform screening colonoscopy at the same setting.        Kennedy Blue M.D.  [facility]  [address] [place] [address]  Office: [phone]

## 2020-01-14 ENCOUNTER — HOSPITAL ENCOUNTER (OUTPATIENT)
Facility: HOSPITAL | Age: 70
Setting detail: HOSPITAL OUTPATIENT SURGERY
Discharge: HOME | End: 2020-01-14
Attending: INTERNAL MEDICINE | Admitting: INTERNAL MEDICINE

## 2020-01-14 ENCOUNTER — ANESTHESIA (OUTPATIENT)
Dept: GASTROENTEROLOGY | Facility: HOSPITAL | Age: 70
End: 2020-01-14

## 2020-01-14 ENCOUNTER — ANESTHESIA EVENT (OUTPATIENT)
Dept: GASTROENTEROLOGY | Facility: HOSPITAL | Age: 70
End: 2020-01-14

## 2020-01-14 VITALS
DIASTOLIC BLOOD PRESSURE: 60 MMHG | SYSTOLIC BLOOD PRESSURE: 99 MMHG | RESPIRATION RATE: 16 BRPM | HEIGHT: 72 IN | WEIGHT: 204 LBS | OXYGEN SATURATION: 95 % | BODY MASS INDEX: 27.63 KG/M2 | HEART RATE: 83 BPM

## 2020-01-14 DIAGNOSIS — R07.9 CHEST PAIN, UNSPECIFIED TYPE: ICD-10-CM

## 2020-01-14 DIAGNOSIS — Z12.11 ENCOUNTER FOR SCREENING FOR MALIGNANT NEOPLASM OF COLON: ICD-10-CM

## 2020-01-14 PROCEDURE — 25010000002 PROPOFOL 10 MG/ML EMULSION: Performed by: ANESTHESIOLOGY

## 2020-01-14 PROCEDURE — 45380 COLONOSCOPY AND BIOPSY: CPT | Performed by: INTERNAL MEDICINE

## 2020-01-14 PROCEDURE — 88305 TISSUE EXAM BY PATHOLOGIST: CPT | Performed by: INTERNAL MEDICINE

## 2020-01-14 PROCEDURE — 43239 EGD BIOPSY SINGLE/MULTIPLE: CPT | Performed by: INTERNAL MEDICINE

## 2020-01-14 PROCEDURE — 45385 COLONOSCOPY W/LESION REMOVAL: CPT | Performed by: INTERNAL MEDICINE

## 2020-01-14 RX ORDER — PROPOFOL 10 MG/ML
VIAL (ML) INTRAVENOUS AS NEEDED
Status: DISCONTINUED | OUTPATIENT
Start: 2020-01-14 | End: 2020-01-14 | Stop reason: SURG

## 2020-01-14 RX ORDER — SODIUM CHLORIDE, SODIUM LACTATE, POTASSIUM CHLORIDE, CALCIUM CHLORIDE 600; 310; 30; 20 MG/100ML; MG/100ML; MG/100ML; MG/100ML
30 INJECTION, SOLUTION INTRAVENOUS CONTINUOUS
Status: DISCONTINUED | OUTPATIENT
Start: 2020-01-14 | End: 2020-01-14 | Stop reason: HOSPADM

## 2020-01-14 RX ORDER — SUCRALFATE ORAL 1 G/10ML
1 SUSPENSION ORAL 4 TIMES DAILY
Qty: 1200 ML | Refills: 1 | Status: SHIPPED | OUTPATIENT
Start: 2020-01-14 | End: 2020-02-13

## 2020-01-14 RX ORDER — LIDOCAINE HYDROCHLORIDE 20 MG/ML
INJECTION, SOLUTION INFILTRATION; PERINEURAL AS NEEDED
Status: DISCONTINUED | OUTPATIENT
Start: 2020-01-14 | End: 2020-01-14 | Stop reason: SURG

## 2020-01-14 RX ADMIN — SODIUM CHLORIDE, POTASSIUM CHLORIDE, SODIUM LACTATE AND CALCIUM CHLORIDE 30 ML/HR: 600; 310; 30; 20 INJECTION, SOLUTION INTRAVENOUS at 10:17

## 2020-01-14 RX ADMIN — LIDOCAINE HYDROCHLORIDE 100 MG: 20 INJECTION, SOLUTION INFILTRATION; PERINEURAL at 10:27

## 2020-01-14 RX ADMIN — PROPOFOL 600 MG: 10 INJECTION, EMULSION INTRAVENOUS at 10:27

## 2020-01-14 NOTE — ANESTHESIA PREPROCEDURE EVALUATION
Anesthesia Evaluation     Patient summary reviewed and Nursing notes reviewed   NPO Solid Status: > 8 hours  NPO Liquid Status: > 4 hours           Airway   Mallampati: II  TM distance: >3 FB  Neck ROM: full  no difficulty expected  Dental - normal exam     Pulmonary - normal exam   Cardiovascular - normal exam    (+) hyperlipidemia,       Neuro/Psych  (+) headaches,     GI/Hepatic/Renal/Endo      Musculoskeletal     Abdominal  - normal exam   Substance History      OB/GYN          Other                        Anesthesia Plan    ASA 3     MAC       Anesthetic plan, all risks, benefits, and alternatives have been provided, discussed and informed consent has been obtained with: patient.

## 2020-01-14 NOTE — ANESTHESIA POSTPROCEDURE EVALUATION
"Patient: Heriberto Macario Jr.    Procedure Summary     Date:  01/14/20 Room / Location:  [facility] ENDOSCOPY 10 / [facility] ENDOSCOPY    Anesthesia Start:  1022 Anesthesia Stop:  1102    Procedures:       ESOPHAGOGASTRODUODENOSCOPY WITH COLD BIOPSIES (N/A Esophagus)      COLONOSCOPY TO CECUM AND INTO TERMINAL ILEUM WITH COLD BIOPSY POLYPECTOMY AND COLD SNARE POLYPECTOMY (N/A ) Diagnosis:       Chest pain, unspecified type      Encounter for screening for malignant neoplasm of colon      (Chest pain, unspecified type [R07.9])      (Encounter for screening for malignant neoplasm of colon [Z12.11])    Surgeon:  Kennedy Blue MD Provider:  Sam Nails MD    Anesthesia Type:  MAC ASA Status:  3          Anesthesia Type: MAC    Vitals  Vitals Value Taken Time   BP 99/60 1/14/2020 11:21 AM   Temp     Pulse 83 1/14/2020 11:21 AM   Resp 16 1/14/2020 11:21 AM   SpO2 95 % 1/14/2020 11:21 AM           Post Anesthesia Care and Evaluation    Patient location during evaluation: bedside  Patient participation: complete - patient participated  Level of consciousness: awake and alert  Pain management: adequate  Airway patency: patent  Anesthetic complications: No anesthetic complications    Cardiovascular status: acceptable  Respiratory status: acceptable  Hydration status: acceptable    Comments: BP 99/60   Pulse 83   Resp 16   Ht 182.9 cm (72\")   Wt 92.5 kg (204 lb)   SpO2 95%   BMI 27.67 kg/m²       "

## 2020-01-14 NOTE — DISCHARGE INSTRUCTIONS

## 2020-01-15 LAB
CYTO UR: NORMAL
LAB AP CASE REPORT: NORMAL
PATH REPORT.FINAL DX SPEC: NORMAL
PATH REPORT.GROSS SPEC: NORMAL

## 2020-01-15 RX ORDER — LOVASTATIN 40 MG/1
TABLET ORAL
Qty: 90 TABLET | Refills: 3 | Status: SHIPPED | OUTPATIENT
Start: 2020-01-15 | End: 2021-04-20

## 2020-02-10 ENCOUNTER — TELEPHONE (OUTPATIENT)
Dept: GASTROENTEROLOGY | Facility: CLINIC | Age: 70
End: 2020-02-10

## 2020-02-10 NOTE — TELEPHONE ENCOUNTER
----- Message from Kennedy Blue MD sent at 2/2/2020  6:37 PM EST -----  Barretts - recall EGD 1 year  Colon with one TA one hyperplastic polyp - recall 5 years  Needs to be on daily PPI  O/V with BG in 8-10 weeks

## 2020-02-10 NOTE — TELEPHONE ENCOUNTER
Health Maintenance updated to reflect EGD due 1/2021.  Health Maintenance updated to reflect C/S due 1/2025.

## 2020-02-11 NOTE — TELEPHONE ENCOUNTER
Returned patient's phone call. Advised as per Dr. Blue's note. He verb understanding and is agreeable to the plan. F/u office visit scheduled for 4/15/2020@1000.

## 2020-04-10 RX ORDER — PANTOPRAZOLE SODIUM 40 MG/1
TABLET, DELAYED RELEASE ORAL
Qty: 90 TABLET | Refills: 1 | Status: SHIPPED | OUTPATIENT
Start: 2020-04-10

## 2020-07-16 ENCOUNTER — APPOINTMENT (OUTPATIENT)
Dept: CT IMAGING | Facility: HOSPITAL | Age: 70
End: 2020-07-16

## 2020-07-16 ENCOUNTER — HOSPITAL ENCOUNTER (EMERGENCY)
Facility: HOSPITAL | Age: 70
Discharge: HOME | End: 2020-07-16
Attending: EMERGENCY MEDICINE | Admitting: EMERGENCY MEDICINE

## 2020-07-16 ENCOUNTER — APPOINTMENT (OUTPATIENT)
Dept: GENERAL RADIOLOGY | Facility: HOSPITAL | Age: 70
End: 2020-07-16

## 2020-07-16 VITALS
BODY MASS INDEX: 28.04 KG/M2 | HEIGHT: 72 IN | WEIGHT: 207 LBS | DIASTOLIC BLOOD PRESSURE: 100 MMHG | SYSTOLIC BLOOD PRESSURE: 153 MMHG | HEART RATE: 88 BPM | TEMPERATURE: 98.6 F | RESPIRATION RATE: 16 BRPM | OXYGEN SATURATION: 92 %

## 2020-07-16 DIAGNOSIS — R07.89 ATYPICAL CHEST PAIN: ICD-10-CM

## 2020-07-16 DIAGNOSIS — D72.829 LEUKOCYTOSIS, UNSPECIFIED TYPE: Primary | ICD-10-CM

## 2020-07-16 DIAGNOSIS — R10.13 EPIGASTRIC PAIN: ICD-10-CM

## 2020-07-16 LAB
ALBUMIN SERPL-MCNC: 4.7 G/DL (ref 3.5–5.2)
ALBUMIN/GLOB SERPL: 1.7 G/DL
ALP SERPL-CCNC: 57 U/L (ref 39–117)
ALT SERPL W P-5'-P-CCNC: 22 U/L (ref 1–41)
ANION GAP SERPL CALCULATED.3IONS-SCNC: 12.8 MMOL/L (ref 5–15)
APTT PPP: 31.9 SECONDS (ref 22.7–35.4)
AST SERPL-CCNC: 15 U/L (ref 1–40)
BACTERIA UR QL AUTO: ABNORMAL /HPF
BASOPHILS # BLD AUTO: 0.05 10*3/MM3 (ref 0–0.2)
BASOPHILS NFR BLD AUTO: 0.3 % (ref 0–1.5)
BILIRUB SERPL-MCNC: 0.7 MG/DL (ref 0–1.2)
BILIRUB UR QL STRIP: NEGATIVE
BUN SERPL-MCNC: 12 MG/DL (ref 8–23)
BUN/CREAT SERPL: 12.9 (ref 7–25)
CALCIUM SPEC-SCNC: 9.4 MG/DL (ref 8.6–10.5)
CHLORIDE SERPL-SCNC: 102 MMOL/L (ref 98–107)
CLARITY UR: CLEAR
CO2 SERPL-SCNC: 24.2 MMOL/L (ref 22–29)
COLOR UR: YELLOW
CREAT SERPL-MCNC: 0.93 MG/DL (ref 0.76–1.27)
DEPRECATED RDW RBC AUTO: 41.1 FL (ref 37–54)
EOSINOPHIL # BLD AUTO: 0.01 10*3/MM3 (ref 0–0.4)
EOSINOPHIL NFR BLD AUTO: 0.1 % (ref 0.3–6.2)
ERYTHROCYTE [DISTWIDTH] IN BLOOD BY AUTOMATED COUNT: 12.6 % (ref 12.3–15.4)
GFR SERPL CREATININE-BSD FRML MDRD: 80 ML/MIN/1.73
GLOBULIN UR ELPH-MCNC: 2.7 GM/DL
GLUCOSE SERPL-MCNC: 147 MG/DL (ref 65–99)
GLUCOSE UR STRIP-MCNC: NEGATIVE MG/DL
HCT VFR BLD AUTO: 43.3 % (ref 37.5–51)
HGB BLD-MCNC: 14.8 G/DL (ref 13–17.7)
HGB UR QL STRIP.AUTO: ABNORMAL
HOLD SPECIMEN: NORMAL
HOLD SPECIMEN: NORMAL
HYALINE CASTS UR QL AUTO: ABNORMAL /LPF
IMM GRANULOCYTES # BLD AUTO: 0.08 10*3/MM3 (ref 0–0.05)
IMM GRANULOCYTES NFR BLD AUTO: 0.5 % (ref 0–0.5)
INR PPP: 0.96 (ref 0.9–1.1)
KETONES UR QL STRIP: ABNORMAL
LEUKOCYTE ESTERASE UR QL STRIP.AUTO: NEGATIVE
LIPASE SERPL-CCNC: 21 U/L (ref 13–60)
LYMPHOCYTES # BLD AUTO: 1.26 10*3/MM3 (ref 0.7–3.1)
LYMPHOCYTES NFR BLD AUTO: 8.6 % (ref 19.6–45.3)
MAGNESIUM SERPL-MCNC: 2.1 MG/DL (ref 1.6–2.4)
MCH RBC QN AUTO: 30.4 PG (ref 26.6–33)
MCHC RBC AUTO-ENTMCNC: 34.2 G/DL (ref 31.5–35.7)
MCV RBC AUTO: 88.9 FL (ref 79–97)
MONOCYTES # BLD AUTO: 0.71 10*3/MM3 (ref 0.1–0.9)
MONOCYTES NFR BLD AUTO: 4.9 % (ref 5–12)
NEUTROPHILS NFR BLD AUTO: 12.51 10*3/MM3 (ref 1.7–7)
NEUTROPHILS NFR BLD AUTO: 85.6 % (ref 42.7–76)
NITRITE UR QL STRIP: NEGATIVE
NRBC BLD AUTO-RTO: 0 /100 WBC (ref 0–0.2)
PH UR STRIP.AUTO: <=5 [PH] (ref 5–8)
PLATELET # BLD AUTO: 301 10*3/MM3 (ref 140–450)
PMV BLD AUTO: 9.4 FL (ref 6–12)
POTASSIUM SERPL-SCNC: 4.3 MMOL/L (ref 3.5–5.2)
PROT SERPL-MCNC: 7.4 G/DL (ref 6–8.5)
PROT UR QL STRIP: ABNORMAL
PROTHROMBIN TIME: 12.7 SECONDS (ref 11.7–14.2)
RBC # BLD AUTO: 4.87 10*6/MM3 (ref 4.14–5.8)
RBC # UR: ABNORMAL /HPF
REF LAB TEST METHOD: ABNORMAL
SODIUM SERPL-SCNC: 139 MMOL/L (ref 136–145)
SP GR UR STRIP: >=1.03 (ref 1–1.03)
SQUAMOUS #/AREA URNS HPF: ABNORMAL /HPF
TROPONIN T SERPL-MCNC: <0.01 NG/ML (ref 0–0.03)
TROPONIN T SERPL-MCNC: <0.01 NG/ML (ref 0–0.03)
UROBILINOGEN UR QL STRIP: ABNORMAL
WBC # BLD AUTO: 14.62 10*3/MM3 (ref 3.4–10.8)
WBC UR QL AUTO: ABNORMAL /HPF
WHOLE BLOOD HOLD SPECIMEN: NORMAL
WHOLE BLOOD HOLD SPECIMEN: NORMAL

## 2020-07-16 PROCEDURE — 80053 COMPREHEN METABOLIC PANEL: CPT | Performed by: EMERGENCY MEDICINE

## 2020-07-16 PROCEDURE — 71045 X-RAY EXAM CHEST 1 VIEW: CPT

## 2020-07-16 PROCEDURE — 99285 EMERGENCY DEPT VISIT HI MDM: CPT

## 2020-07-16 PROCEDURE — 96376 TX/PRO/DX INJ SAME DRUG ADON: CPT

## 2020-07-16 PROCEDURE — 25010000002 HYDROMORPHONE PER 4 MG: Performed by: EMERGENCY MEDICINE

## 2020-07-16 PROCEDURE — 85730 THROMBOPLASTIN TIME PARTIAL: CPT | Performed by: EMERGENCY MEDICINE

## 2020-07-16 PROCEDURE — 81001 URINALYSIS AUTO W/SCOPE: CPT | Performed by: EMERGENCY MEDICINE

## 2020-07-16 PROCEDURE — 84484 ASSAY OF TROPONIN QUANT: CPT | Performed by: EMERGENCY MEDICINE

## 2020-07-16 PROCEDURE — 83690 ASSAY OF LIPASE: CPT | Performed by: EMERGENCY MEDICINE

## 2020-07-16 PROCEDURE — 85610 PROTHROMBIN TIME: CPT | Performed by: EMERGENCY MEDICINE

## 2020-07-16 PROCEDURE — 93005 ELECTROCARDIOGRAM TRACING: CPT | Performed by: EMERGENCY MEDICINE

## 2020-07-16 PROCEDURE — 74177 CT ABD & PELVIS W/CONTRAST: CPT

## 2020-07-16 PROCEDURE — 83735 ASSAY OF MAGNESIUM: CPT | Performed by: EMERGENCY MEDICINE

## 2020-07-16 PROCEDURE — 85025 COMPLETE CBC W/AUTO DIFF WBC: CPT | Performed by: EMERGENCY MEDICINE

## 2020-07-16 PROCEDURE — 96374 THER/PROPH/DIAG INJ IV PUSH: CPT

## 2020-07-16 PROCEDURE — 93010 ELECTROCARDIOGRAM REPORT: CPT | Performed by: INTERNAL MEDICINE

## 2020-07-16 PROCEDURE — 25010000002 IOPAMIDOL 61 % SOLUTION: Performed by: EMERGENCY MEDICINE

## 2020-07-16 RX ORDER — SUCRALFATE 1 G/1
1 TABLET ORAL 4 TIMES DAILY
Qty: 16 TABLET | Refills: 0 | Status: SHIPPED | OUTPATIENT
Start: 2020-07-16

## 2020-07-16 RX ORDER — SODIUM CHLORIDE 0.9 % (FLUSH) 0.9 %
10 SYRINGE (ML) INJECTION AS NEEDED
Status: DISCONTINUED | OUTPATIENT
Start: 2020-07-16 | End: 2020-07-16 | Stop reason: HOSPADM

## 2020-07-16 RX ORDER — HYDROMORPHONE HYDROCHLORIDE 1 MG/ML
0.5 INJECTION, SOLUTION INTRAMUSCULAR; INTRAVENOUS; SUBCUTANEOUS ONCE
Status: COMPLETED | OUTPATIENT
Start: 2020-07-16 | End: 2020-07-16

## 2020-07-16 RX ORDER — LIDOCAINE HYDROCHLORIDE 20 MG/ML
15 SOLUTION OROPHARYNGEAL ONCE
Status: COMPLETED | OUTPATIENT
Start: 2020-07-16 | End: 2020-07-16

## 2020-07-16 RX ORDER — SUCRALFATE 1 G/1
1 TABLET ORAL 4 TIMES DAILY
Qty: 16 TABLET | Refills: 0 | OUTPATIENT
Start: 2020-07-16 | End: 2020-07-16 | Stop reason: SDUPTHER

## 2020-07-16 RX ORDER — ALUMINA, MAGNESIA, AND SIMETHICONE 2400; 2400; 240 MG/30ML; MG/30ML; MG/30ML
15 SUSPENSION ORAL ONCE
Status: COMPLETED | OUTPATIENT
Start: 2020-07-16 | End: 2020-07-16

## 2020-07-16 RX ADMIN — SODIUM CHLORIDE, PRESERVATIVE FREE 10 ML: 5 INJECTION INTRAVENOUS at 14:38

## 2020-07-16 RX ADMIN — IOPAMIDOL 85 ML: 612 INJECTION, SOLUTION INTRAVENOUS at 15:05

## 2020-07-16 RX ADMIN — LIDOCAINE HYDROCHLORIDE 15 ML: 20 SOLUTION ORAL; TOPICAL at 17:16

## 2020-07-16 RX ADMIN — ALUMINUM HYDROXIDE, MAGNESIUM HYDROXIDE, AND DIMETHICONE 15 ML: 400; 400; 40 SUSPENSION ORAL at 17:16

## 2020-07-16 RX ADMIN — HYDROMORPHONE HYDROCHLORIDE 0.5 MG: 1 INJECTION, SOLUTION INTRAMUSCULAR; INTRAVENOUS; SUBCUTANEOUS at 12:36

## 2020-07-16 RX ADMIN — HYDROMORPHONE HYDROCHLORIDE 0.5 MG: 1 INJECTION, SOLUTION INTRAMUSCULAR; INTRAVENOUS; SUBCUTANEOUS at 14:37

## 2020-07-16 NOTE — ED NOTES
Epigastric pain since midnight. Pt went to immediate care center today and was found to have EKG changes. EMS treated with 4 NTG & Aspirin.    Pt arrived wearing a face mask.     Linda Shen RN  07/16/20 1110

## 2020-07-16 NOTE — DISCHARGE INSTRUCTIONS
You are advised to follow closely with Dr. baer, Dr. Carranza in 2-3 days for recheck, final results of lab work and imaging testing, and further testing/treatment as needed.  [place] diet.  Avoid tomato, mint, citrus, spicy/fatty foods    Please return to the emergency department immediately with chest pain persistent or worsening, shortness of air, persistent or worsening abdominal pain, persistent vomiting/fever, blood in emesis or stool, lightheadedness/fainting, problems with speech, one sided weakness/numbness, new incontinence, problems with vision,  or for worsening of symptoms or other concerns.

## 2020-07-16 NOTE — ED PROVIDER NOTES
EMERGENCY DEPARTMENT ENCOUNTER    CHIEF COMPLAINT  Chief Complaint: Abdominal pain  History given by: Patient  History limited by: Nothing  Room Number: 18/18  PMD: Angel Trevino Jr., MD      HPI:  Pt is a 69 y.o. male who presents with upper abdominal pain bandlike onset approximately midnight and constant since that time.  Patient reports the discomfort runs across lower ribs bilaterally.  Patient denies shortness of air, nausea/vomiting, cough, fever, changes in urinary or bowel habits, blood or black color to stool.  Patient reports the pain is a dull ache not affected by position, eating or drinking not improved with Mylanta and Tums at home.  Patient reports he occasionally drinks alcohol, is not a smoker and denies drug use.  Patient denies history of hypertension or diabetes but does state he takes his medication for elevated cholesterol.  Patient denies family history of coronary artery disease stents or MIs in direct family members.  Patient reports he was seen in [facility] and told to come to the ER for further evaluation    Duration: 12 hours  Intensity/Severity: Moderate  Associated Symptoms: No  Aggravating Factors: No  Alleviating Factors: No  Previous Episodes: Years ago when patient was told he had pericarditis if the age of 39  Treatment before arrival: Seen in [facility]    PAST MEDICAL HISTORY  Active Ambulatory Problems     Diagnosis Date Noted   • Polyp of vocal cord 03/30/2016   • Elevated cholesterol 03/30/2016   • Benign prostatic hyperplasia with urinary obstruction 03/30/2016   • Migraine without status migrainosus, not intractable 03/30/2016   • Pain in lateral right lower extremity 03/30/2016   • Bursitis/tendonitis, shoulder 04/02/2018   • Chest pain 12/17/2019   • Encounter for screening for malignant neoplasm of colon 12/17/2019     Resolved Ambulatory Problems     Diagnosis Date Noted   • No Resolved Ambulatory Problems     Past Medical History:   Diagnosis Date   • BPH (benign  prostatic hyperplasia)    • Diverticulosis    • GERD (gastroesophageal reflux disease)    • Hyperlipidemia    • Migraine        PAST SURGICAL HISTORY  Past Surgical History:   Procedure Laterality Date   • COLONOSCOPY W/ POLYPECTOMY N/A 1/14/2020    Procedure: COLONOSCOPY TO CECUM AND INTO TERMINAL ILEUM WITH COLD BIOPSY POLYPECTOMY AND COLD SNARE POLYPECTOMY;  Surgeon: Kennedy Blue MD;  Location: [facility] ENDOSCOPY;  Service: Gastroenterology   • ENDOSCOPY N/A 1/14/2020    Procedure: ESOPHAGOGASTRODUODENOSCOPY WITH COLD BIOPSIES;  Surgeon: Kennedy Blue MD;  Location: [facility] ENDOSCOPY;  Service: Gastroenterology       FAMILY HISTORY  Family History   Problem Relation Age of Onset   • Colon cancer Paternal Uncle        SOCIAL HISTORY  Social History     Socioeconomic History   • Marital status: Single     Spouse name: Not on file   • Number of children: Not on file   • Years of education: Not on file   • Highest education level: Not on file   Tobacco Use   • Smoking status: Former Smoker   • Smokeless tobacco: Never Used   Substance and Sexual Activity   • Alcohol use: Yes     Comment: 2 glasses a week    • Drug use: No   • Sexual activity: Defer       ALLERGIES  Patient has no known allergies.    REVIEW OF SYSTEMS  Review of Systems   Constitutional: Negative for chills and fever.   HENT: Negative for sore throat and trouble swallowing.    Eyes: Negative for visual disturbance.   Respiratory: Negative for cough and shortness of breath.    Cardiovascular: Positive for chest pain ( Bandlike across lower ribs bilaterally). Negative for leg swelling.   Gastrointestinal: Positive for abdominal pain. Negative for diarrhea and vomiting.   Endocrine: Negative.    Genitourinary: Negative for decreased urine volume and frequency.   Musculoskeletal: Negative for neck pain.   Skin: Negative for rash.   Allergic/Immunologic: Negative.    Neurological: Negative for weakness and numbness.   Hematological:  Negative.    Psychiatric/Behavioral: Negative.    All other systems reviewed and are negative.      PHYSICAL EXAM  ED Triage Vitals   Temp Heart Rate Resp BP SpO2   07/16/20 1119 07/16/20 1119 07/16/20 1119 07/16/20 1119 07/16/20 1119   98.6 °F (37 °C) 80 18 116/72 98 %      Temp src Heart Rate Source Patient Position BP Location FiO2 (%)   -- 07/16/20 1133 -- -- --    Monitor          Physical Exam   Constitutional: He is oriented to person, place, and time. He appears distressed.   HENT:   Head: Normocephalic and atraumatic.   Eyes: EOM are normal.   Neck: Normal range of motion.   Cardiovascular: Normal rate, regular rhythm and normal heart sounds.   No murmur heard.  Pulses:       Posterior tibial pulses are 2+ on the right side, and 2+ on the left side.   Pulmonary/Chest: Effort normal and breath sounds normal. No respiratory distress. He has no wheezes.   Abdominal: Soft. Bowel sounds are normal. There is no tenderness. There is no rebound and no guarding.   Musculoskeletal: Normal range of motion. He exhibits no edema.   Neurological: He is alert and oriented to person, place, and time.   Skin: Skin is warm and dry.   Psychiatric: Affect normal.   Nursing note and vitals reviewed.      LAB RESULTS  Lab Results (last 24 hours)     Procedure Component Value Units Date/Time    CBC & Differential [[id number]] Collected:  07/16/20 1140    Specimen:  Blood Updated:  07/16/20 1201    Narrative:       The following orders were created for panel order CBC & Differential.  Procedure                               Abnormality         Status                     ---------                               -----------         ------                     CBC Auto Differential[[id number]]        Abnormal            Final result                 Please view results for these tests on the individual orders.    Comprehensive Metabolic Panel [[id number]]  (Abnormal) Collected:  07/16/20 1140    Specimen:  Blood Updated:  07/16/20 1216      Glucose 147 mg/dL      BUN 12 mg/dL      Creatinine 0.93 mg/dL      Sodium 139 mmol/L      Potassium 4.3 mmol/L      Chloride 102 mmol/L      CO2 24.2 mmol/L      Calcium 9.4 mg/dL      Total Protein 7.4 g/dL      Albumin 4.70 g/dL      ALT (SGPT) 22 U/L      AST (SGOT) 15 U/L      Alkaline Phosphatase 57 U/L      Total Bilirubin 0.7 mg/dL      eGFR Non African Amer 80 mL/min/1.73      Globulin 2.7 gm/dL      A/G Ratio 1.7 g/dL      BUN/Creatinine Ratio 12.9     Anion Gap 12.8 mmol/L     Narrative:       GFR Normal >60  Chronic Kidney Disease <60  Kidney Failure <15      Lipase [[id number]]  (Normal) Collected:  07/16/20 1140    Specimen:  Blood Updated:  07/16/20 1216     Lipase 21 U/L     Magnesium [[id number]]  (Normal) Collected:  07/16/20 1140    Specimen:  Blood Updated:  07/16/20 1216     Magnesium 2.1 mg/dL     CBC Auto Differential [[id number]]  (Abnormal) Collected:  07/16/20 1140    Specimen:  Blood Updated:  07/16/20 1201     WBC 14.62 10*3/mm3      RBC 4.87 10*6/mm3      Hemoglobin 14.8 g/dL      Hematocrit 43.3 %      MCV 88.9 fL      MCH 30.4 pg      MCHC 34.2 g/dL      RDW 12.6 %      RDW-SD 41.1 fl      MPV 9.4 fL      Platelets 301 10*3/mm3      Neutrophil % 85.6 %      Lymphocyte % 8.6 %      Monocyte % 4.9 %      Eosinophil % 0.1 %      Basophil % 0.3 %      Immature Grans % 0.5 %      Neutrophils, Absolute 12.51 10*3/mm3      Lymphocytes, Absolute 1.26 10*3/mm3      Monocytes, Absolute 0.71 10*3/mm3      Eosinophils, Absolute 0.01 10*3/mm3      Basophils, Absolute 0.05 10*3/mm3      Immature Grans, Absolute 0.08 10*3/mm3      nRBC 0.0 /100 WBC     Protime-INR [[id number]]  (Normal) Collected:  07/16/20 1140    Specimen:  Blood Updated:  07/16/20 1306     Protime 12.7 Seconds      INR 0.96    aPTT [[id number]]  (Normal) Collected:  07/16/20 1140    Specimen:  Blood Updated:  07/16/20 1306     PTT 31.9 seconds     Troponin [[id number]]  (Normal) Collected:  07/16/20 1140    Specimen:  Blood Updated:   07/16/20 1309     Troponin T <0.010 ng/mL     Narrative:       Troponin T Reference Range:  <= 0.03 ng/mL-   Negative for AMI  >0.03 ng/mL-     Abnormal for myocardial necrosis.  Clinicians would have to utilize clinical acumen, EKG, Troponin and serial changes to determine if it is an Acute Myocardial Infarction or myocardial injury due to an underlying chronic condition.       Results may be falsely decreased if patient taking Biotin.      Urinalysis With Microscopic If Indicated (No Culture) - Urine, Clean Catch [[id number]]  (Abnormal) Collected:  07/16/20 1256    Specimen:  Urine, Clean Catch Updated:  07/16/20 1331     Color, UA Yellow     Appearance, UA Clear     pH, UA <=5.0     Specific Gravity, UA >=1.030     Glucose, UA Negative     Ketones, UA Trace     Bilirubin, UA Negative     Blood, UA Small (1+)     Protein, UA 30 mg/dL (1+)     Leuk Esterase, UA Negative     Nitrite, UA Negative     Urobilinogen, UA 0.2 E.U./dL    Urinalysis, Microscopic Only - Urine, Clean Catch [[id number]]  (Abnormal) Collected:  07/16/20 1256    Specimen:  Urine, Clean Catch Updated:  07/16/20 1331     RBC, UA 13-20 /HPF      WBC, UA 0-2 /HPF      Bacteria, UA None Seen /HPF      Squamous Epithelial Cells, UA 0-2 /HPF      Hyaline Casts, UA 7-12 /LPF      Methodology Automated Microscopy          I ordered the above labs and reviewed the results    RADIOLOGY  CT Abdomen Pelvis With Contrast   Preliminary Result   No acute abnormality is seen. Please follow-up as clinically   indicated.       Discussed with Dr. Kraft.          XR Chest 1 View   Final Result   No focal pulmonary consolidation. Follow-up/further   evaluation can be obtained as clinical indications persist.       This report was finalized on 7/16/2020 1:05 PM by Dr. Fermin Martin M.D.               I ordered the above noted radiological studies. Interpreted by radiologist. Discussed with radiologist (Dr. Martin). Viewed by me in PACS.        PROCEDURES  Procedures      PROGRESS AND CONSULTS  ED Course as of Jul 16 2103   u Jul 16, 2020   1748 Discussed with patient his lab results including no signs of damage to his heart on testing in the emergency department he reports his pain is in his upper abdomen rating into the lower abdomen at this point, improved compared with previous today after medications in the ER.  Patient reports he does not want to stay in the hospital, and agrees with plan for close outpatient follow-up with Dr. Oconnell in 3 days for further testing, treatment as needed.  Patient agrees to return to the emergency department immediately with worsening of his chest pain, shortness of air, lightheadedness or other concerns.    [TO]   1749 Discussed with Mounika Oconnell and she is aware of patient's presentation, labs, imaging results and agrees to see patient in the office on Monday 7/20 for recheck, further testing, treatment as needed.    [TO]      ED Course User Index  [TO] Florence Kraft MD      Heart score 4    Prehospital EKG          EKG time: 1049  Rhythm/Rate: Sinus rhythm, rate in the 90s  P waves and VT: Normal P waves short DEBBIE  QRS, axis: Right bundle branch pattern  ST and T waves: Nonspecific ST findings anterior lateral leads    Interpreted Contemporaneously by me, independently viewed  changed compared to prior 11/8/2019, ST abnormalities new    EKG          EKG time: 1254  Rhythm/Rate: Sinus rhythm, rate in the 60s  P waves and VT: Normal P waves, normal DEBBIE's  QRS, axis: Right bundle branch block  ST and T waves: Nonspecific    Interpreted Contemporaneously by me, independently viewed  changed compared to prior done today at 1049, ST segments improved        MEDICAL DECISION MAKING  Results were reviewed/discussed with the patient and they were also made aware of online access. Pt also made aware that some labs, such as cultures, will not be resulted during ER visit and follow up with PMD is necessary.        MDM       DIAGNOSIS  Final diagnoses:   Leukocytosis, unspecified type   Epigastric pain   Atypical chest pain       DISPOSITION  DISCHARGE    Patient discharged in stable condition.    Reviewed implications of results, diagnosis, meds, responsibility to follow up, warning signs and symptoms of possible worsening, potential complications and reasons to return to ER.    Patient/Family voiced understanding of above instructions.    Discussed plan for discharge, as there is no emergent indication for admission. Patient referred to primary care provider for BP management due to today's BP. Pt/family is agreeable and understands need for follow up and repeat testing.  Pt is aware that discharge does not mean that nothing is wrong but it indicates no emergency is present that requires admission and they must continue care with follow-up as given below or physician of their choice.     FOLLOW-UP  Mounika Carranza MD  [address] [facility]  [facility] [address]  [place] [id number] [phone]    Schedule an appointment as soon as possible for a visit in 3 days  EVEN IF WELL    Angel Trevino Jr., MD  [address] [facility] [address]  [place] [address]  [phone]    Schedule an appointment as soon as possible for a visit in 3 days  EVEN IF WELL    Shanna Angel MD  [address] [facility] [address]  [place] [address]  [phone]    Schedule an appointment as soon as possible for a visit in 1 week  As needed         Medication List      New Prescriptions    sucralfate 1 g tablet  Commonly known as:  CARAFATE  Take 1 tablet by mouth 4 (Four) Times a Day.              Latest Documented Vital Signs:  As of 14:31  BP- 153/92 HR- 68 Temp- 98.6 °F (37 °C) O2 sat- 94%    --  Patient was wearing facemask when I entered the room and throughout our encounter. Full protective equipment was worn throughout this patient encounter including a face mask, eye protection and gloves. Hand hygiene was performed before donning protective equipment and after  removal when leaving the room.      Florence Kraft MD  07/16/20 6844

## 2020-07-16 NOTE — ED NOTES
Pt 02 saturation checked during ambulation. Pt stayed consistent at 90% on room air walking from one end of the das to the other. MD notified.      Laura Hernandez  07/16/20 1027

## 2020-07-18 ENCOUNTER — DOCUMENTATION (OUTPATIENT)
Dept: INTERNAL MEDICINE | Facility: CLINIC | Age: 70
End: 2020-07-18

## 2020-07-18 DIAGNOSIS — R10.9 FLANK PAIN: Primary | ICD-10-CM

## 2020-07-18 RX ORDER — METRONIDAZOLE 500 MG/1
500 TABLET ORAL 3 TIMES DAILY
Qty: 21 TABLET | Refills: 0 | Status: SHIPPED | OUTPATIENT
Start: 2020-07-18 | End: 2020-07-25

## 2020-07-18 RX ORDER — AMOXICILLIN AND CLAVULANATE POTASSIUM 875; 125 MG/1; MG/1
1 TABLET, FILM COATED ORAL 2 TIMES DAILY
Qty: 14 TABLET | Refills: 0 | Status: SHIPPED | OUTPATIENT
Start: 2020-07-18

## 2020-07-18 RX ORDER — OXYCODONE AND ACETAMINOPHEN 10; 325 MG/1; MG/1
1 TABLET ORAL EVERY 6 HOURS PRN
Qty: 20 TABLET | Refills: 0 | Status: SHIPPED | OUTPATIENT
Start: 2020-07-18

## 2020-07-18 RX ORDER — ONDANSETRON 4 MG/1
4 TABLET, FILM COATED ORAL EVERY 8 HOURS PRN
Qty: 24 TABLET | Refills: 0 | Status: SHIPPED | OUTPATIENT
Start: 2020-07-18

## 2020-07-18 NOTE — PROGRESS NOTES
Talk to the patient's niece at length on Saturday he continues to have mid abdominal pain after a full evaluation emergency room.  Question of migraines brought up he mostly has abdominal pain review of CT notes a lot of stool in right colon.  He does have some diverticulosis.  He had leukocytosis on lab work-up with negative indices otherwise.  He has had a negative nuclear stress test as of last November.  Negative troponin in the emergency room as well.  Negative pancreatic enzymes negative liver enzymes.  He did have microscopic hematuria and question of kidney stone was brought up although he is never had a kidney stone.  Current plan is to give him a limited Percocet 10 every 4 to 6 as needed #20 Zofran 4 mg every 8 as needed nausea #24 Flagyl 500 3 times daily #21 Augmentin 875 twice daily #14 to cover the colon and urinary tract pending further work-up.  He will follow-up with me in the office and is encouraged go back to emergency room for any worsening symptomatology.

## 2020-10-27 ENCOUNTER — TELEPHONE (OUTPATIENT)
Dept: INTERNAL MEDICINE | Facility: CLINIC | Age: 70
End: 2020-10-27

## 2020-10-27 DIAGNOSIS — J38.1 POLYP OF VOCAL CORD: Primary | ICD-10-CM

## 2020-10-27 NOTE — TELEPHONE ENCOUNTER
PATIENT CALLED TO REQUEST REFERRAL FOR ENT DR MARTINEZ AT [facility]. PATIENT STATED DR BEATTY RECOMMENDED ANOTHER ENT BUT THAT  DID NOT TAKE HIS INSURANCE AND THIS ONE WILL.     PATIENT CALL BACK NUMBER: [phone]

## 2020-12-07 RX ORDER — BUSPIRONE HYDROCHLORIDE 5 MG/1
5 TABLET ORAL 3 TIMES DAILY
Qty: 90 TABLET | Refills: 3 | Status: SHIPPED | OUTPATIENT
Start: 2020-12-07

## 2021-04-20 RX ORDER — LOVASTATIN 40 MG/1
TABLET ORAL
Qty: 90 TABLET | Refills: 3 | Status: SHIPPED | OUTPATIENT
Start: 2021-04-20 | End: 2022-05-06

## 2022-04-05 RX ORDER — LOVASTATIN 40 MG/1
TABLET ORAL
Qty: 90 TABLET | Refills: 3 | OUTPATIENT
Start: 2022-04-05

## 2022-04-07 NOTE — TELEPHONE ENCOUNTER
PATIENT KEEPS CALLING AND MEDICATION IS NOT GETTING REFILLED AND NEEDS TO KNOW WHAT'S GOING ON    PLEASE CALL BACK ASAP    [phone]    Caller: Heriberto Macario Jr.    Relationship: Self    Requested Prescriptions:   Requested Prescriptions     Refused Prescriptions Disp Refills   • lovastatin (MEVACOR) 40 MG tablet [Pharmacy Med Name: LOVASTATIN 40MG TABLETS] 90 tablet 3     Sig: TAKE 1 TABLET BY MOUTH ONCE DAILY     Refused By: JOSE M KOLB     Reason for Refusal: Patient needs an appointment        Pharmacy where request should be sent: [id number] [address] AT [facility] & [facility] [phone] [facility] [phone]

## 2022-05-06 RX ORDER — LOVASTATIN 40 MG/1
TABLET ORAL
Qty: 90 TABLET | Refills: 3 | Status: SHIPPED | OUTPATIENT
Start: 2022-05-06

## 2023-02-10 ENCOUNTER — PRE-PROCEDURE SCREENING (OUTPATIENT)
Dept: GASTROENTEROLOGY | Facility: CLINIC | Age: 73
End: 2023-02-10
Payer: MEDICARE

## 2023-02-10 NOTE — TELEPHONE ENCOUNTER
QUESTIONNAIRE SCEEENING IN MEDIA &   HAS BEEN SENT TO DOCTOR FOR REVIEW

## 2023-02-14 ENCOUNTER — PREP FOR SURGERY (OUTPATIENT)
Dept: OTHER | Facility: HOSPITAL | Age: 73
End: 2023-02-14
Payer: MEDICARE

## 2023-02-14 DIAGNOSIS — K22.70 BARRETT'S ESOPHAGUS WITHOUT DYSPLASIA: Primary | ICD-10-CM

## 2023-03-14 ENCOUNTER — TELEPHONE (OUTPATIENT)
Dept: INTERNAL MEDICINE | Facility: CLINIC | Age: 73
End: 2023-03-14
Payer: MEDICARE

## 2023-03-14 NOTE — TELEPHONE ENCOUNTER
Number is out of service    Pt has not been seen since 11/8/19 and will need an officer visit     OK FOR HUB TO READ AND SCHEDULE

## 2023-03-14 NOTE — TELEPHONE ENCOUNTER
Caller: Heriberto Macario Jr.    Relationship: Self    Best call back number: [phone]    What medication are you requesting: MEDROL PACK     What are your current symptoms: CAN BARELY WALK     How long have you been experiencing symptoms:     Have you had these symptoms before:    [] Yes  [] No    Have you been treated for these symptoms before:   [] Yes  [] No    If a prescription is needed, what is your preferred pharmacy and phone number: [id number] [address] [phone] [facility] [phone]      Additional notes:  PLEASE ADVISE ASAP

## 2023-04-16 ENCOUNTER — DOCUMENTATION (OUTPATIENT)
Dept: INTERNAL MEDICINE | Facility: CLINIC | Age: 73
End: 2023-04-16
Payer: MEDICARE

## 2023-04-16 DIAGNOSIS — R10.9 FLANK PAIN: ICD-10-CM

## 2023-04-16 DIAGNOSIS — M54.41 ACUTE RIGHT-SIDED LOW BACK PAIN WITH RIGHT-SIDED SCIATICA: Primary | ICD-10-CM

## 2023-04-16 DIAGNOSIS — M54.41 ACUTE RIGHT-SIDED LOW BACK PAIN WITH RIGHT-SIDED SCIATICA: ICD-10-CM

## 2023-04-16 RX ORDER — METHYLPREDNISOLONE 4 MG/1
TABLET ORAL
Qty: 21 TABLET | Refills: 1 | Status: SHIPPED | OUTPATIENT
Start: 2023-04-16 | End: 2023-04-25

## 2023-04-16 RX ORDER — OXYCODONE HYDROCHLORIDE 15 MG/1
15 TABLET ORAL EVERY 6 HOURS PRN
Qty: 12 TABLET | Refills: 0 | Status: SHIPPED | OUTPATIENT
Start: 2023-04-16 | End: 2023-04-16 | Stop reason: SDUPTHER

## 2023-04-16 RX ORDER — METHYLPREDNISOLONE 4 MG/1
TABLET ORAL
Qty: 21 TABLET | Refills: 1 | Status: SHIPPED | OUTPATIENT
Start: 2023-04-16 | End: 2023-04-16 | Stop reason: SDUPTHER

## 2023-04-16 RX ORDER — OXYCODONE HYDROCHLORIDE 15 MG/1
15 TABLET ORAL EVERY 6 HOURS PRN
Qty: 12 TABLET | Refills: 0 | Status: SHIPPED | OUTPATIENT
Start: 2023-04-16 | End: 2023-04-21 | Stop reason: SDUPTHER

## 2023-04-16 NOTE — PROGRESS NOTES
Mr. Macario contacted me Sunday, 4/16/2023 with progressive sciatica over 3 weeks of an excruciating nature.  Given the repetitive nature I sent him a Medrol Dosepak and a limited number of oxycodone 15 mg every 6 as needed #12.  He has had Percocet in the past.  We will set up an urgent MRI with either [facility] or [facility].

## 2023-04-17 ENCOUNTER — TELEPHONE (OUTPATIENT)
Dept: INTERNAL MEDICINE | Facility: CLINIC | Age: 73
End: 2023-04-17
Payer: MEDICARE

## 2023-04-17 NOTE — TELEPHONE ENCOUNTER
Caller: Heriberto Macario Jr.    Relationship: Self    Best call back number: [phone]    What is the best time to reach you: ANY     Who are you requesting to speak with (clinical staff, provider,  specific staff member): DR BEATTY     What was the call regarding: WANT TO SPEAK TO DR BEATTY OR HIS NURSE DIRECTLY ABOUT GETTING AN MRI SCHEDULED TODAY.     Do you require a callback: YES

## 2023-04-17 NOTE — TELEPHONE ENCOUNTER
Caller: Heriberto Macario Jr.    Relationship: Self    Best call back number: [phone]    Who are you requesting to speak with (clinical staff, provider,  specific staff member): DR. BEATTY'S NURSE    What was the call regarding: PATIENT CALLED AGAIN TO CHECK STATUS OF PREVIOUS REQUEST.     PLEASE ADVISE.

## 2023-04-17 NOTE — TELEPHONE ENCOUNTER
I spoke with the pt and gave him the number for scheduling to reach out to get his MRI scheduled that Dr Trevino has already ordered. [phone] for scheduling

## 2023-04-19 ENCOUNTER — TELEPHONE (OUTPATIENT)
Dept: INTERNAL MEDICINE | Facility: CLINIC | Age: 73
End: 2023-04-19

## 2023-04-19 NOTE — TELEPHONE ENCOUNTER
Caller: Heriberto Macario Jr.    Relationship: Self    Best call back number:     What is the best time to reach you:    Who are you requesting to speak with (clinical staff, provider,  specific staff member):     Do you know the name of the person who called:     What was the call regarding: PATIENT IS CALLING IN TO REQUEST A CALL BACK FROM [facility] IN REGARDS TO A RECOMMENDATION TO A ORTHO DOCTOR OR IF HE NEEDS TO GO TO THE EMERGENCY ROOM FOR SEVERE SCIATIC PAIN.    Do you require a callback: YES

## 2023-04-19 NOTE — TELEPHONE ENCOUNTER
I spoke with the pt and advised if his pain is that severe he may need to go to the ER to be evaluated, he agreed to do so and wanted me to let you know.

## 2023-04-20 ENCOUNTER — HOSPITAL ENCOUNTER (EMERGENCY)
Facility: HOSPITAL | Age: 73
Discharge: HOME | End: 2023-04-20
Attending: EMERGENCY MEDICINE
Payer: MEDICARE

## 2023-04-20 ENCOUNTER — APPOINTMENT (OUTPATIENT)
Dept: CT IMAGING | Facility: HOSPITAL | Age: 73
End: 2023-04-20
Payer: MEDICARE

## 2023-04-20 VITALS
BODY MASS INDEX: 27.77 KG/M2 | DIASTOLIC BLOOD PRESSURE: 79 MMHG | HEART RATE: 81 BPM | WEIGHT: 205 LBS | TEMPERATURE: 97.2 F | RESPIRATION RATE: 18 BRPM | OXYGEN SATURATION: 93 % | HEIGHT: 72 IN | SYSTOLIC BLOOD PRESSURE: 130 MMHG

## 2023-04-20 DIAGNOSIS — M51.16 LUMBAR DISC HERNIATION WITH RADICULOPATHY: Primary | ICD-10-CM

## 2023-04-20 PROCEDURE — 72131 CT LUMBAR SPINE W/O DYE: CPT

## 2023-04-20 PROCEDURE — 99283 EMERGENCY DEPT VISIT LOW MDM: CPT

## 2023-04-20 PROCEDURE — 96372 THER/PROPH/DIAG INJ SC/IM: CPT

## 2023-04-20 PROCEDURE — 25010000002 KETOROLAC TROMETHAMINE PER 15 MG: Performed by: PHYSICIAN ASSISTANT

## 2023-04-20 RX ORDER — LIDOCAINE 50 MG/G
1 PATCH TOPICAL ONCE
Status: DISCONTINUED | OUTPATIENT
Start: 2023-04-20 | End: 2023-04-20 | Stop reason: HOSPADM

## 2023-04-20 RX ORDER — METHOCARBAMOL 750 MG/1
750 TABLET, FILM COATED ORAL ONCE
Status: COMPLETED | OUTPATIENT
Start: 2023-04-20 | End: 2023-04-20

## 2023-04-20 RX ORDER — KETOROLAC TROMETHAMINE 30 MG/ML
30 INJECTION, SOLUTION INTRAMUSCULAR; INTRAVENOUS ONCE
Status: COMPLETED | OUTPATIENT
Start: 2023-04-20 | End: 2023-04-20

## 2023-04-20 RX ORDER — CYCLOBENZAPRINE HCL 5 MG
TABLET ORAL
Qty: 30 TABLET | Refills: 0 | Status: SHIPPED | OUTPATIENT
Start: 2023-04-20

## 2023-04-20 RX ADMIN — KETOROLAC TROMETHAMINE 30 MG: 30 INJECTION, SOLUTION INTRAMUSCULAR at 12:58

## 2023-04-20 RX ADMIN — METHOCARBAMOL TABLETS 750 MG: 750 TABLET, COATED ORAL at 12:58

## 2023-04-20 RX ADMIN — LIDOCAINE 1 PATCH: 50 PATCH TOPICAL at 13:00

## 2023-04-20 NOTE — ED PROVIDER NOTES
TRIAGE PROVIDER NOTE    I personally performed a brief face-to-face medical evaluation of the patient upon their arrival to the emergency department.  This exam was performed in an effort to decrease the time from intake to seeing a provider and to decrease delays in care.  The history of present illness is condensed.  Other providers may see the patient as well if deemed necessary after this evaluation.    HPI: Heriberto Macario Jr. is a 72 y.o. male with a PMH significant for BPH, migraines who presents to the ED c/o atraumatic right-sided low back pain over the past couple of months.  He has chronic low back pain that flares up every several years but is typically managed with steroids and supportive medications that alleviate symptoms completely.  He takes care of his 98-year-old mother and does have to perform lifting on a regular basis that aggravates his symptoms.  He has increased pain with range of motion and changing positions from lying to sitting or sitting to standing.  Denies numbness or weakness to the extremities, bowel or bladder incontinence, saddle paresthesias.  No known injury.      FOCUSED PHYSICAL EXAM:  ED Triage Vitals   Temp Heart Rate Resp BP SpO2   04/20/23 1241 04/20/23 1241 04/20/23 1241 04/20/23 1249 04/20/23 1241   97.2 °F (36.2 °C) 104 18 130/79 93 %      Temp src Heart Rate Source Patient Position BP Location FiO2 (%)   -- 04/20/23 1249 04/20/23 1249 04/20/23 1249 --    Monitor Sitting Left arm      General: No acute distress  Lungs: Clear to auscultation bilaterally, no wheezes  Heart: Regular rate and rhythm, no murmur, DP pulses 2+ and equal bilaterally  Back: Vague tenderness to the right-sided low back.  Negative SLR on the right side.  Neurologically intact grossly.  Abdomen: Soft, nontender, nondistended  Extremities: No gross injury or deformity        ASSESSMENT/PLAN:  Patient presentation and evaluation initially consistent with right-sided lumbar radiculopathy.  No recent  imaging in our system.  Plan for CT of the lumbar spine for further evaluation while medications are ordered for supportive care.    Orders Placed This Encounter   Procedures   • CT Lumbar Spine Without Contrast        Provider Attestation:  I personally reviewed the past medical history, past surgical history, social history, family history, current medications, and allergies as they appear in the chart.    1255 I, Giuseppe Dominguez PA-C, I evaluated the patient briefly at triage and performed an evaluation.  The contents of this note to this point have been provided by myself.      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   • Elevated cholesterol 03/30/2016   • Benign prostatic hyperplasia with urinary obstruction 03/30/2016   • Migraine without status migrainosus, not intractable 03/30/2016   • Pain in lateral right lower extremity 03/30/2016   • Bursitis/tendonitis, shoulder 04/02/2018   • Chest pain 12/17/2019   • Encounter for screening for malignant neoplasm of colon 12/17/2019     Resolved Ambulatory Problems     Diagnosis Date Noted   • No Resolved Ambulatory Problems     Past Medical History:   Diagnosis Date   • BPH (benign prostatic hyperplasia)    • Diverticulosis    • GERD (gastroesophageal reflux disease)    • Hyperlipidemia    • Migraine          PAST SURGICAL HISTORY  Past Surgical History:   Procedure Laterality Date   • COLONOSCOPY W/ POLYPECTOMY N/A 1/14/2020    Procedure: COLONOSCOPY TO CECUM AND INTO TERMINAL ILEUM WITH COLD BIOPSY POLYPECTOMY AND COLD SNARE POLYPECTOMY;  Surgeon: Kennedy Blue MD;  Location: [facility] ENDOSCOPY;  Service: Gastroenterology   • ENDOSCOPY N/A 1/14/2020    Procedure: ESOPHAGOGASTRODUODENOSCOPY WITH COLD BIOPSIES;  Surgeon: Kennedy Blue MD;  Location: [facility]  ENDOSCOPY;  Service: Gastroenterology         FAMILY HISTORY  Family History   Problem Relation Age of Onset   • Colon cancer Paternal Uncle          SOCIAL HISTORY  Social History     Socioeconomic History   • Marital status: Single   Tobacco Use   • Smoking status: Former   • Smokeless tobacco: Never   Substance and Sexual Activity   • Alcohol use: Yes     Comment: 2 glasses a week    • Drug use: No   • Sexual activity: Defer         ALLERGIES  Patient has no known allergies.        REVIEW OF SYSTEMS  Review of Systems   Constitutional: Negative for fever.   Cardiovascular: Negative for chest pain and leg swelling.   Genitourinary: Negative for difficulty urinating.   Musculoskeletal: Positive for back pain.   Neurological: Positive for numbness (occasional, radiates down RLE). Negative for weakness.         LAB RESULTS  No results found for this or any previous visit (from the past 24 hour(s)).    Ordered the above labs and reviewed the results.        RADIOLOGY  CT Lumbar Spine Without Contrast    Result Date: 4/20/2023  CT SCAN OF THE LUMBAR SPINE WITHOUT CONTRAST  CLINICAL HISTORY:Right-sided low back pain.  TECHNIQUE: CT scan of the lumbar spine was obtained with a combination of 3 mm and 2 mm axial images. Sagittal and coronal reconstructed images were obtained.  FINDINGS:  There is loss of usual curvature of the lumbar spine.  At T10-11, T11-12, T12-L1, L1-2, and L2-3, there is no significant canal or foraminal narrowing.  At L3-4, there is degenerative retrolisthesis of L3 on L4 by approximately 3 mm. Bulging disc material mildly narrows the neural foramina and minimally narrows the spinal canal.  At L4-5, there is a moderate size right central/right subarticular disc extrusion which causes mass effect upon the descending right L5 nerve root. Vacuum disc phenomena are appreciated at L4-5 and there is mild loss of disc space and foraminal height. Bulging disc material and loss of foraminal height mildly  narrow the neural foramina at L4-5.  At L5-S1, there are advanced degenerative disc changes. Vacuum disc phenomena are appreciated. There is minimal foraminal narrowing secondary to loss of foraminal height. Degenerative retrolisthesis of L5 on S1 by approximately 2 to 3 mm is seen.       There is a moderate size right central/right subarticular disc extrusion at L4-5 which causes mass effect upon the descending right L5 nerve root sleeve.  The remaining degenerative phenomena within the lumbar spine are as discussed in detail above.  These findings were discussed with Drew Dominguez on 04/20/2023 at approximately 2:25 PM.  Radiation dose reduction techniques were utilized, including automated exposure control and exposure modulation based on body size.  This report was finalized on 4/20/2023 5:07 PM by Dr. Jorge Stevenson M.D.        Ordered the above noted radiological studies. Reviewed by me in PACS.            PROCEDURES  Procedures      MEDICATIONS GIVEN IN ER  Medications   methocarbamol (ROBAXIN) tablet 750 mg (750 mg Oral Given 4/20/23 1258)   ketorolac (TORADOL) injection 30 mg (30 mg Intramuscular Given 4/20/23 1258)                   MEDICAL DECISION MAKING, PROGRESS, and CONSULTS    All labs have been independently reviewed by me.  All radiology studies have been reviewed by me and I have also reviewed the radiology report.   EKG's independently viewed and interpreted by me.  Discussion below represents my analysis of pertinent findings related to patient's condition, differential diagnosis, treatment plan and final disposition.      Additional sources:    - External (non-ED) record review: Office visit with ENT Dr. 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    - Chronic or social conditions impacting care: None    - Shared decision making: Discussed plan with patient regarding physical therapy and neurosurgery follow-up for radicular pain.        Orders  placed during this visit:  Orders Placed This Encounter   Procedures   • CT Lumbar Spine Without Contrast         Additional orders considered but not ordered:  None        Differential diagnosis:    Lumbar radiculopathy, spinal stenosis, muscle spasm, DVT      Independent interpretation of labs, radiology studies, and discussions with consultants:  ED Course as of 04/20/23 2045   u Apr 20, 2023   1437 Preliminary report from CT shows disc herniation of L4/L5 with compression of L5 nerve root. [DC]   1501 CT Lumbar Spine Without Contrast  Radiology report reviewed, L4/L5 disc herniation with compression of the L5 nerve root [DC-2]      ED Course User Index  [DC] Oliva Méndez PA  [DC-2] Murali Koch MD             DIAGNOSIS  Final diagnoses:   Lumbar disc herniation with radiculopathy         DISPOSITION  Discharge            Latest Documented Vital Signs:  As of 20:45 EDT  BP- 130/79 HR- 81 Temp- 97.2 °F (36.2 °C) O2 sat- 93%              --    Please note that portions of this were completed with a voice recognition program.       Note Disclaimer: At [facility], we believe that sharing information builds trust and better relationships. You are receiving this note because you are receiving care at [facility] or recently visited. It is possible you will see health information before a provider has talked with you about it. This kind of information can be easy to misunderstand. To help you fully understand what it means for your health, we urge you to discuss this note with your provider.           Oliva Méndez PA  04/20/23 2046       Oliva Méndez PA  04/20/23 2058

## 2023-04-20 NOTE — ED PROVIDER NOTES
Pt presents to the ED c/o  right low back pain rating into the right leg over the last several months worse recently.  Denies any saddle anesthesia or any urinary or fecal incontinence or retention.  Continues to be able to ambulate.  Has been started on a Medrol Dosepak and is on day 3.  Scheduled for an MRI sometime in May.     On exam,   General: No acute distress, nontoxic  HEENT: EOMI  Pulm: Symmetric chest rise, nonlabored breathing  CV: Regular rate and rhythm  GI: Nondistended  MSK: No deformity  Skin: Warm, dry  Neuro: Awake, alert, oriented x 4, intact reflexes in the bilateral lower extremities, moving all extremities, no focal deficits  Psych: Calm, cooperative    Vital signs and nursing notes reviewed.         Plan:   ED Course as of 04/20/23 1502   Thu Apr 20, 2023   1437 Preliminary report from CT shows disc herniation of L4/L5 with compression of L5 nerve root. [DC]   1501 CT Lumbar Spine Without Contrast  Radiology report reviewed, L4/L5 disc herniation with compression of the L5 nerve root [DC-2]      ED Course User Index  [DC] Oliva Méndez PA  [DC-2] Murali Koch MD     Plan for muscle relaxers, continuance with the Medrol Dosepak, outpatient neurosurgery follow-up, recommended physical therapy evaluation in the outpatient setting, ED return for worsening symptoms as needed.     Attestation:  The JENELLE and I have discussed this patient's history, physical exam, and treatment plan.  I have reviewed the documentation and personally had a face to face interaction with the patient. I affirm the documentation and agree with the treatment and plan.  The attached note describes my personal findings.          Murali Koch MD  04/20/23 2699

## 2023-04-20 NOTE — ED TRIAGE NOTES
Pt c/o right lower back pain that started several months ago. Pain radiates down right leg. Pt has been to chiropractor and pmd. Pt was placed on prednisone 2days ago. Pt scheduled for MRI next month

## 2023-04-21 ENCOUNTER — TELEPHONE (OUTPATIENT)
Dept: INTERNAL MEDICINE | Facility: CLINIC | Age: 73
End: 2023-04-21

## 2023-04-21 ENCOUNTER — DOCUMENTATION (OUTPATIENT)
Dept: INTERNAL MEDICINE | Facility: CLINIC | Age: 73
End: 2023-04-21
Payer: MEDICARE

## 2023-04-21 DIAGNOSIS — M54.41 ACUTE RIGHT-SIDED LOW BACK PAIN WITH RIGHT-SIDED SCIATICA: ICD-10-CM

## 2023-04-21 RX ORDER — OXYCODONE HYDROCHLORIDE 15 MG/1
15 TABLET ORAL EVERY 6 HOURS PRN
Qty: 16 TABLET | Refills: 0 | Status: SHIPPED | OUTPATIENT
Start: 2023-04-21

## 2023-04-21 RX ORDER — DEXAMETHASONE 4 MG/1
TABLET ORAL
Qty: 12 TABLET | Refills: 0 | Status: SHIPPED | OUTPATIENT
Start: 2023-04-21 | End: 2023-04-29

## 2023-04-21 NOTE — PROGRESS NOTES
[id number] has severe pain and acute sciatica in the right secondary to a disc protrusion noted on CT scan.  We are pending an MRI and referral to neurosurgery we will give him a refill on his pain medicines short run as well as a taper dose dexamethasone 4 mg twice daily for 4 days and 4 mg daily for 4 days.

## 2023-04-21 NOTE — TELEPHONE ENCOUNTER
I spoke with the pt and he is in severe pain, please review his CT and put an Urgent referral ASAP

## 2023-04-21 NOTE — TELEPHONE ENCOUNTER
Caller: Heriberto Macario Jr.    Relationship to patient: Self    Best call back number: [phone]    Patient is needing: PATIENT STATES HE WAS SEEN IN ER. ASKING TO SEE DR JACI MOTT TO BE CLEARED FOR BACK SURGERY   ASKS TO SPEAK TO [facility]

## 2023-04-24 RX ORDER — LOVASTATIN 40 MG/1
TABLET ORAL
Qty: 90 TABLET | Refills: 3 | Status: SHIPPED | OUTPATIENT
Start: 2023-04-24

## 2023-04-25 ENCOUNTER — OFFICE VISIT (OUTPATIENT)
Dept: INTERNAL MEDICINE | Facility: CLINIC | Age: 73
End: 2023-04-25
Payer: MEDICARE

## 2023-04-25 VITALS
DIASTOLIC BLOOD PRESSURE: 84 MMHG | OXYGEN SATURATION: 97 % | BODY MASS INDEX: 27.26 KG/M2 | HEART RATE: 84 BPM | WEIGHT: 201 LBS | SYSTOLIC BLOOD PRESSURE: 128 MMHG

## 2023-04-25 DIAGNOSIS — M51.26 PROTRUSION OF LUMBAR INTERVERTEBRAL DISC: ICD-10-CM

## 2023-04-25 DIAGNOSIS — M54.16 ACUTE RIGHT LUMBAR RADICULOPATHY: Primary | ICD-10-CM

## 2023-04-25 DIAGNOSIS — M54.41 ACUTE RIGHT-SIDED LOW BACK PAIN WITH RIGHT-SIDED SCIATICA: ICD-10-CM

## 2023-04-25 PROCEDURE — 99213 OFFICE O/P EST LOW 20 MIN: CPT | Performed by: FAMILY MEDICINE

## 2023-04-25 RX ORDER — DEXAMETHASONE 2 MG/1
TABLET ORAL
Qty: 12 TABLET | Refills: 0 | Status: SHIPPED | OUTPATIENT
Start: 2023-04-25 | End: 2023-05-03

## 2023-04-25 RX ORDER — OXYCODONE HYDROCHLORIDE 15 MG/1
15 TABLET ORAL EVERY 6 HOURS PRN
Qty: 16 TABLET | Refills: 0 | Status: SHIPPED | OUTPATIENT
Start: 2023-04-25

## 2023-04-25 NOTE — PROGRESS NOTES
"Chief Complaint  Back Pain    Subjective        Heriberto Macario Jr. presents to [facility]  History of Present Illness  Heriberto is a delightful gentleman who is unfortunately had a onset of acute right lumbar radiculopathy which has been very severe on CT noted to be from an acute large disc protrusion at L4-5 affecting the right L5 nerve root.  This causes radiculopathy elevated the foot.  He is walking with a walker.  We are providing pain medicine with oxycodone 15 mg every 4-6 hours as needed and taper dose dexamethasone which has been helping.  We are expediting if at all possible an MRI and referral to back specialist/neurosurgery.  Back Pain        Objective   Vital Signs:  /84 (BP Location: Left arm, Patient Position: Sitting, Cuff Size: Adult)   Pulse 84   Wt 91.2 kg (201 lb)   SpO2 97%   BMI 27.26 kg/m²   Estimated body mass index is 27.26 kg/m² as calculated from the following:    Height as of 4/20/23: 182.9 cm (72\").    Weight as of this encounter: 91.2 kg (201 lb).             Physical Exam  Vitals reviewed.   Constitutional:       Appearance: He is well-developed.   HENT:      Head: Normocephalic and atraumatic.      Right Ear: Tympanic membrane and external ear normal.      Left Ear: Tympanic membrane and external ear normal.      Nose: Nose normal.   Eyes:      Conjunctiva/sclera: Conjunctivae normal.      Pupils: Pupils are equal, round, and reactive to light.   Neck:      Thyroid: No thyromegaly.      Vascular: No JVD.   Cardiovascular:      Rate and Rhythm: Normal rate and regular rhythm.      Heart sounds: Normal heart sounds.   Pulmonary:      Effort: Pulmonary effort is normal.      Breath sounds: Normal breath sounds.   Abdominal:      General: Bowel sounds are normal.      Palpations: Abdomen is soft.   Musculoskeletal:         General: Normal range of motion.      Cervical back: Normal range of motion and neck supple.   Lymphadenopathy:      Cervical: No " cervical adenopathy.   Skin:     General: Skin is warm and dry.      Findings: No rash.   Neurological:      Mental Status: He is alert and oriented to person, place, and time.      Cranial Nerves: No cranial nerve deficit.      Coordination: Coordination normal.   Psychiatric:         Behavior: Behavior normal.         Thought Content: Thought content normal.         Judgment: Judgment normal.        Result Review :                   Assessment and Plan   Diagnoses and all orders for this visit:    1. Acute right lumbar radiculopathy (Primary)    2. Protrusion of lumbar intervertebral disc    Other orders  -     dexamethasone (DECADRON) 2 MG tablet; Take 2 tablets by mouth Daily With Breakfast for 4 days, THEN 1 tablet Daily With Breakfast for 4 days.  Dispense: 12 tablet; Refill: 0             Follow Up   No follow-ups on file.  Patient was given instructions and counseling regarding his condition or for health maintenance advice. Please see specific information pulled into the AVS if appropriate.

## 2023-04-26 ENCOUNTER — TELEPHONE (OUTPATIENT)
Dept: INTERNAL MEDICINE | Facility: CLINIC | Age: 73
End: 2023-04-26

## 2023-04-26 NOTE — TELEPHONE ENCOUNTER
Caller: Heriberto Macario Jr.    Relationship: Self    Best call back number: [phone]    What is the best time to reach you: ANYTIME     Who are you requesting to speak with (clinical staff, provider,  specific staff member): CLINICAL STAFF    What was the call regarding: PATIENT IS REQUESTING A CALL BACK FROM [facility] AS SOON AS POSSIBLE.     PATIENT IS VERY UPSET.     HUB STAFF UNABLE TO WARM TRANSFER     Do you require a callback: YES

## 2023-05-09 ENCOUNTER — OFFICE VISIT (OUTPATIENT)
Dept: NEUROSURGERY | Facility: CLINIC | Age: 73
End: 2023-05-09
Payer: MEDICARE

## 2023-05-09 VITALS
DIASTOLIC BLOOD PRESSURE: 70 MMHG | BODY MASS INDEX: 27.22 KG/M2 | SYSTOLIC BLOOD PRESSURE: 102 MMHG | HEIGHT: 72 IN | WEIGHT: 201 LBS

## 2023-05-09 DIAGNOSIS — M51.16 LUMBAR DISC HERNIATION WITH RADICULOPATHY: Primary | ICD-10-CM

## 2023-05-09 PROCEDURE — 1160F RVW MEDS BY RX/DR IN RCRD: CPT | Performed by: NEUROLOGICAL SURGERY

## 2023-05-09 PROCEDURE — 99204 OFFICE O/P NEW MOD 45 MIN: CPT | Performed by: NEUROLOGICAL SURGERY

## 2023-05-09 PROCEDURE — 1159F MED LIST DOCD IN RCRD: CPT | Performed by: NEUROLOGICAL SURGERY

## 2023-05-09 RX ORDER — CEFAZOLIN SODIUM 2 G/100ML
2 INJECTION, SOLUTION INTRAVENOUS ONCE
OUTPATIENT
Start: 2023-05-17 | End: 2023-05-09

## 2023-05-09 NOTE — PROGRESS NOTES
"Subjective   History of Present Illness: Heriberto Macario Jr. is a 72 y.o. male is being seen for consultation today at the request of Angel Trevino MD for back pain that radiates into the right leg with numbness in his right leg.  He had chronic symptomatology with regard to his back but he says for the last 6 months the symptoms have been progressing despite physical therapy and chiropractic care.  He states that for the last 3 weeks he has had relatively intractable right lower extremity pain.  Changing positions and standing tends to aggravate the pain more than lying down.  He has tried oral steroids and pain medication.  He has recently come off oral steroids and today he is felt sweaty and dizzy.    Back Pain  The problem has been gradually worsening since onset. The pain is present in the lumbar spine. The quality of the pain is described as aching. The pain radiates to the right thigh, right foot and right knee. Associated symptoms include numbness.       Tobacco Use: Medium Risk   • Smoking Tobacco Use: Former   • Smokeless Tobacco Use: Never   • Passive Exposure: Not on file        The following portions of the patient's history were reviewed and updated as appropriate: allergies, current medications, past family history, past medical history, past social history, past surgical history and problem list.    Review of Systems   Musculoskeletal: Positive for back pain.   Neurological: Positive for numbness.       Objective     Vitals:    05/09/23 1442   BP: 102/70   Weight: 91.2 kg (201 lb)   Height: 182.9 cm (72\")     Body mass index is 27.26 kg/m².      Physical Exam  Neurologic Exam    Physical Exam:    CONSTITUTIONAL: This 72 year old  male appears well developed, well-nourished and frequently changing positions in the office to avoid significant pain    HEAD & FACE: the head and face are symmetric, normocephalic and atraumatic.    EYES: Inspection of the conjunctivae and lids reveals no swelling, " erythema or discharge.  Pupils are round, equal and reactive to light and there is no scleral icterus.    EARS, NOSE, MOUTH & THROAT: On inspection, the ears and nose are within normal limits.    NECK: the neck is supple and symmetric. The trachea is midline with no masses.    PULMONARY: Respiratory effort is normal with no increased work of breathing or signs of respiratory distress.    CARDIOVASCULAR: Pedal pulses are +2/4 bilaterally. Examination of the extremities shows no edema or varicosities.    MUSCULOSKELETAL: Gait and station reveal that he does have lancinating pain when he changes positions and he does use a walker to assist his gait because of the unreliability with the right leg pain. The spine has no tenderness to palpation    SKIN: The skin is warm, dry and intact    NEUROLOGIC:   Cranial Nerves 2-12 intact  Normal motor strength noted. Muscle bulk and tone are normal.  Sensory exam is normal to fine touch to confrontational testing bilaterally  Reflexes on the right side demonstrates 1/4 Knee Jerk Reflex, 0/4 Ankle Jerk Reflex and no ankle clonus on the right.   Reflexes on the left side demonstrates 1/4 Knee Jerk Reflex, 0/4 Ankle Jerk Reflex and no ankle clonus on the left.  Superficial/Primitive Reflexes: primitive reflexes were absent.  Stafford's, Babinski, and Clonus signs all negative.  No coordination deficit observed.  Radicular testing showed a negative Luis Eduardo (TY) test and positive straight leg raise on the right.  Cortical function is intact and without deficits. Speech is normal.    PSYCHIATRIC: oriented to person, place and time. Patient's mood and affect are normal.    Assessment & Plan   Independent Review of Radiographic Studies:      I personally reviewed the images from the following studies.    MRI of the lumbar spine done at [facility] on May 3, 2023 reveals right-sided disc herniation at L4-5 has moderate to severe right-sided canal narrowing and compression of the L5  nerve root in that location.  There is other areas of disc degeneration throughout the lumbar spine with only mild neuroforaminal narrowing identified at L3-4.    Medical Decision Making:      Patient has severe sciatic distribution pain in the right leg that likely correlates with the L4-5 disc herniation to the right.  He is described symptoms that are progressed over the last 6 months and been unresponsive to physical therapy, chiropractic care, muscle relaxers, pain medicine and now most recently steroids for 3 weeks.  If the intractable pain now extending in the right lower extremity and operative intervention in the form of a right L4-5 lumbar microscopic discectomy is the next option.  Given the progression of symptomatology and the failure of oral steroids I do not think he is a good candidate for interventional pain management.    A lumbar microscopic discectomy involves a small skin incision localized over the affected disc which is confirmed by X-ray. The natural space between lamina bones is widened with a drill and the nerve is gently retracted to expose the disc abnormality. The fragments of disc or bone spurs are removed to make more room around the nerve then the skin is closed with sutures    The patient understands that there are inherent risks to surgery including bleeding, infection, anesthetic risk, death, heart attack, stroke, damage to nerves, weakness, numbness, paralysis, failure to improve, need for further surgery, CSF leak, recurrence, persistent pain, and any other unforeseen complications. They comprehend and had opportunity to ask further questions.    He told us he understands what we discussed that he is going to have to contact us  back regarding the option of surgery.      Regarding his diaphoresis and dizziness I suggest that he contact his primary physician as he just weaned off steroids there may be some few explanations and treatment options for his symptomatology.    Return  for follow-up after surgery.    Diagnoses and all orders for this visit:    1. Lumbar disc herniation with radiculopathy (Primary)  -     Case Request; Standing  -     ceFAZolin (ANCEF) 2 g in sodium chloride 0.9 % 100 mL IVPB  -     Case Request    Other orders  -     Follow Anesthesia Guidelines / Protocol; Future  -     Obtain informed consent  -     Provide NPO Instructions to Patient; Future  -     Follow Anesthesia Guidelines / Protocol; Standing  -     Verify NPO Status; Standing  -     Obtain Informed Consent; Standing  -     SCD (sequential compression device)- to be placed on patient in Pre-op; Standing  -     Instructions on Coughing, Deep Breathing & Incentive Spirometry; Standing             Curt Titus MD FACS [facility]  Neurological Surgery

## 2023-05-10 ENCOUNTER — TELEPHONE (OUTPATIENT)
Dept: NEUROSURGERY | Facility: CLINIC | Age: 73
End: 2023-05-10
Payer: MEDICARE

## 2023-05-10 NOTE — TELEPHONE ENCOUNTER
Patient called and would like to go ahead and schedule surgery with DR. Titus. Please call patient.

## 2023-05-12 ENCOUNTER — TELEPHONE (OUTPATIENT)
Dept: INTERNAL MEDICINE | Facility: CLINIC | Age: 73
End: 2023-05-12
Payer: MEDICARE

## 2023-05-12 NOTE — TELEPHONE ENCOUNTER
Caller: Heriberto Macario Jr.    Relationship: Self    Best call back number: [phone]    What is the best time to reach you: ANYTIME     Who are you requesting to speak with (clinical staff, provider,  specific staff member): CLINICAL STAFF     What was the call regarding: PATIENT IS HAVING HERNIATED DISC SURGERY ON 5/17/23 AND WOULD LIKE TO KEEP DR BEATTY IN THE LOOP.     PATIENT WOULD LIKE TO KNOW IF THERE ARE ANY STANDARD MEDICATIONS THAT FOLLOW THIS PROCEDURE.

## 2023-05-16 ENCOUNTER — PRE-ADMISSION TESTING (OUTPATIENT)
Dept: PREADMISSION TESTING | Facility: HOSPITAL | Age: 73
End: 2023-05-16
Payer: MEDICARE

## 2023-05-16 VITALS
HEART RATE: 99 BPM | SYSTOLIC BLOOD PRESSURE: 148 MMHG | WEIGHT: 201.8 LBS | DIASTOLIC BLOOD PRESSURE: 70 MMHG | TEMPERATURE: 99.4 F | RESPIRATION RATE: 18 BRPM | HEIGHT: 72 IN | BODY MASS INDEX: 27.33 KG/M2 | OXYGEN SATURATION: 95 %

## 2023-05-16 LAB
ANION GAP SERPL CALCULATED.3IONS-SCNC: 11.8 MMOL/L (ref 5–15)
BUN SERPL-MCNC: 17 MG/DL (ref 8–23)
BUN/CREAT SERPL: 18.3 (ref 7–25)
CALCIUM SPEC-SCNC: 9.9 MG/DL (ref 8.6–10.5)
CHLORIDE SERPL-SCNC: 105 MMOL/L (ref 98–107)
CO2 SERPL-SCNC: 27.2 MMOL/L (ref 22–29)
CREAT SERPL-MCNC: 0.93 MG/DL (ref 0.76–1.27)
DEPRECATED RDW RBC AUTO: 44.1 FL (ref 37–54)
EGFRCR SERPLBLD CKD-EPI 2021: 86.7 ML/MIN/1.73
ERYTHROCYTE [DISTWIDTH] IN BLOOD BY AUTOMATED COUNT: 13 % (ref 12.3–15.4)
GLUCOSE SERPL-MCNC: 131 MG/DL (ref 65–99)
HCT VFR BLD AUTO: 44.7 % (ref 37.5–51)
HGB BLD-MCNC: 15.3 G/DL (ref 13–17.7)
MCH RBC QN AUTO: 31.5 PG (ref 26.6–33)
MCHC RBC AUTO-ENTMCNC: 34.2 G/DL (ref 31.5–35.7)
MCV RBC AUTO: 92.2 FL (ref 79–97)
PLATELET # BLD AUTO: 248 10*3/MM3 (ref 140–450)
PMV BLD AUTO: 8.9 FL (ref 6–12)
POTASSIUM SERPL-SCNC: 4.3 MMOL/L (ref 3.5–5.2)
QT INTERVAL: 350 MS
RBC # BLD AUTO: 4.85 10*6/MM3 (ref 4.14–5.8)
SODIUM SERPL-SCNC: 144 MMOL/L (ref 136–145)
WBC NRBC COR # BLD: 7.19 10*3/MM3 (ref 3.4–10.8)

## 2023-05-16 PROCEDURE — 85027 COMPLETE CBC AUTOMATED: CPT

## 2023-05-16 PROCEDURE — 36415 COLL VENOUS BLD VENIPUNCTURE: CPT

## 2023-05-16 PROCEDURE — 93005 ELECTROCARDIOGRAM TRACING: CPT

## 2023-05-16 PROCEDURE — 80048 BASIC METABOLIC PNL TOTAL CA: CPT

## 2023-05-16 NOTE — DISCHARGE INSTRUCTIONS
Take the following medications the morning of surgery:  PANTOPRAZOLE    Arrive to hospital on your day of surgery at  8:30 AM.    If you are on prescription narcotic pain medication to control your pain you may also take that medication the morning of surgery.    General Instructions:  Do not eat solid food after midnight the night before surgery.  You may drink clear liquids day of surgery but must stop at least one hour before your hospital arrival time.  It is beneficial for you to have a clear drink that contains carbohydrates the day of surgery.  We suggest a 12 to 20 ounce bottle of Gatorade or Powerade for non-diabetic patients or a 12 to 20 ounce bottle of G2 or Powerade Zero for diabetic patients. (Pediatric patients, are not advised to drink a 12 to 20 ounce carbohydrate drink)    Clear liquids are liquids you can see through.  Nothing red in color.     Plain water                               Sports drinks  Sodas                                   Gelatin (Jell-O)  Fruit juices without pulp such as white grape juice and apple juice  Popsicles that contain no fruit or yogurt  Tea or coffee (no cream or milk added)  Gatorade / Powerade  G2 / Powerade Zero    Infants may have breast milk up to four hours before surgery.  Infants drinking formula may drink formula up to six hours before surgery.   Patients who avoid smoking, chewing tobacco and alcohol for 4 weeks prior to surgery have a reduced risk of post-operative complications.  Quit smoking as many days before surgery as you can.  Do not smoke, use chewing tobacco or drink alcohol the day of surgery.   If applicable bring your C-PAP/ BI-PAP machine in with you to preop day of surgery.  Bring any papers given to you in the doctor’s office.  Wear clean comfortable clothes.  Do not wear contact lenses, false eyelashes or make-up.  Bring a case for your glasses.   Bring crutches or walker if applicable.  Remove all piercings.  Leave jewelry and any other  valuables at home.  Hair extensions with metal clips must be removed prior to surgery.  The Pre-Admission Testing nurse will instruct you to bring medications if unable to obtain an accurate list in Pre-Admission Testing.        If you were given a blood bank ID arm band remember to bring it with you the day of surgery.    Preventing a Surgical Site Infection:  For 2 to 3 days before surgery, avoid shaving with a razor because the razor can irritate skin and make it easier to develop an infection.    Any areas of open skin can increase the risk of a post-operative wound infection by allowing bacteria to enter and travel throughout the body.  Notify your surgeon if you have any skin wounds / rashes even if it is not near the expected surgical site.  The area will need assessed to determine if surgery should be delayed until it is healed.  The night prior to surgery shower using a fresh bar of anti-bacterial soap (such as Dial) and clean washcloth.  Sleep in a clean bed with clean clothing.  Do not allow pets to sleep with you.  Shower on the morning of surgery using a fresh bar of anti-bacterial soap (such as Dial) and clean washcloth.  Dry with a clean towel and dress in clean clothing.  Ask your surgeon if you will be receiving antibiotics prior to surgery.  Make sure you, your family, and all healthcare providers clean their hands with soap and water or an alcohol based hand  before caring for you or your wound.    Day of surgery:  Your arrival time is approximately two hours before your scheduled surgery time.  Upon arrival, a Pre-op nurse and Anesthesiologist will review your health history, obtain vital signs, and answer questions you may have.  The only belongings needed at this time will be a list of your home medications and if applicable your C-PAP/BI-PAP machine.  A Pre-op nurse will start an IV and you may receive medication in preparation for surgery, including something to help you relax.      Please be aware that surgery does come with discomfort.  We want to make every effort to control your discomfort so please discuss any uncontrolled symptoms with your nurse.   Your doctor will most likely have prescribed pain medications.      If you are going home after surgery you will receive individualized written care instructions before being discharged.  A responsible adult must drive you to and from the hospital on the day of your surgery and stay with you for 24 hours.  Discharge prescriptions can be filled by the hospital pharmacy during regular pharmacy hours.  If you are having surgery late in the day/evening your prescription may be e-prescribed to your pharmacy.  Please verify your pharmacy hours or chose a 24 hour pharmacy to avoid not having access to your prescription because your pharmacy has closed for the day.    If you are staying overnight following surgery, you will be transported to your hospital room following the recovery period.  [facility] has all private rooms.    If you have any questions please call Pre-Admission Testing at [phone].  Deductibles and co-payments are collected on the day of service. Please be prepared to pay the required co-pay, deductible or deposit on the day of service as defined by your plan.    Call your surgeon immediately if you experience any of the following symptoms:  Sore Throat  Shortness of Breath or difficulty breathing  Cough  Chills  Body soreness or muscle pain  Headache  Fever  New loss of taste or smell  Do not arrive for your surgery ill.  Your procedure will need to be rescheduled to another time.  You will need to call your physician before the day of surgery to avoid any unnecessary exposure to hospital staff as well as other patients.

## 2023-05-16 NOTE — H&P
"Subjective   History of Present Illness: Heriberto Macario Jr. is a 72 y.o. male is being seen today for lumbar surgical management of back pain that radiates into the right leg with numbness in his right leg.  He had chronic symptomatology with regard to his back but he says for the last 6 months the symptoms have been progressing despite physical therapy and chiropractic care.  He states that for the last 3 weeks he has had relatively intractable right lower extremity pain.  Changing positions and standing tends to aggravate the pain more than lying down.  He has tried oral steroids and pain medication.  He has recently come off oral steroids and today he is felt sweaty and dizzy.    This document is an updated document from my office visits formulated as a history and physical for the purposes of surgery.    Back Pain  The problem has been gradually worsening since onset. The pain is present in the lumbar spine. The quality of the pain is described as aching. The pain radiates to the right thigh, right foot and right knee. Associated symptoms include numbness.       Tobacco Use: Medium Risk   • Smoking Tobacco Use: Former   • Smokeless Tobacco Use: Never   • Passive Exposure: Not on file        The following portions of the patient's history were reviewed and updated as appropriate: allergies, current medications, past family history, past medical history, past social history, past surgical history and problem list.    Review of Systems   Musculoskeletal: Positive for back pain.   Neurological: Positive for numbness.       Objective     Vitals:    05/09/23 1442   BP: 102/70   Weight: 91.2 kg (201 lb)   Height: 182.9 cm (72\")     Body mass index is 27.26 kg/m².      Physical Exam  Neurologic Exam    Physical Exam:    CONSTITUTIONAL: This 72 year old  male appears well developed, well-nourished and frequently changing positions in the office to avoid significant pain    HEAD & FACE: the head and face are " symmetric, normocephalic and atraumatic.    EYES: Inspection of the conjunctivae and lids reveals no swelling, erythema or discharge.  Pupils are round, equal and reactive to light and there is no scleral icterus.    EARS, NOSE, MOUTH & THROAT: On inspection, the ears and nose are within normal limits.    NECK: the neck is supple and symmetric. The trachea is midline with no masses.    PULMONARY: Respiratory effort is normal with no increased work of breathing or signs of respiratory distress.    CARDIOVASCULAR: Pedal pulses are +2/4 bilaterally. Examination of the extremities shows no edema or varicosities.    MUSCULOSKELETAL: Gait and station reveal that he does have lancinating pain when he changes positions and he does use a walker to assist his gait because of the unreliability with the right leg pain. The spine has no tenderness to palpation    SKIN: The skin is warm, dry and intact    NEUROLOGIC:   Cranial Nerves 2-12 intact  Normal motor strength noted. Muscle bulk and tone are normal.  Sensory exam is normal to fine touch to confrontational testing bilaterally  Reflexes on the right side demonstrates 1/4 Knee Jerk Reflex, 0/4 Ankle Jerk Reflex and no ankle clonus on the right.   Reflexes on the left side demonstrates 1/4 Knee Jerk Reflex, 0/4 Ankle Jerk Reflex and no ankle clonus on the left.  Superficial/Primitive Reflexes: primitive reflexes were absent.  Stafford's, Babinski, and Clonus signs all negative.  No coordination deficit observed.  Radicular testing showed a negative Luis Eduardo (TY) test and positive straight leg raise on the right.  Cortical function is intact and without deficits. Speech is normal.    PSYCHIATRIC: oriented to person, place and time. Patient's mood and affect are normal.    Assessment & Plan   Independent Review of Radiographic Studies:      I personally reviewed the images from the following studies.    MRI of the lumbar spine done at [facility] on May 3, 2023 reveals  right-sided disc herniation at L4-5 has moderate to severe right-sided canal narrowing and compression of the L5 nerve root in that location.  There is other areas of disc degeneration throughout the lumbar spine with only mild neuroforaminal narrowing identified at L3-4.    Medical Decision Making:      Patient has severe sciatic distribution pain in the right leg that likely correlates with the L4-5 disc herniation to the right.  He is described symptoms that are progressed over the last 6 months and been unresponsive to physical therapy, chiropractic care, muscle relaxers, pain medicine and now most recently steroids for 3 weeks.  If the intractable pain now extending in the right lower extremity and operative intervention in the form of a right L4-5 lumbar microscopic discectomy is the next option.  Given the progression of symptomatology and the failure of oral steroids I do not think he is a good candidate for interventional pain management.    A lumbar microscopic discectomy involves a small skin incision localized over the affected disc which is confirmed by X-ray. The natural space between lamina bones is widened with a drill and the nerve is gently retracted to expose the disc abnormality. The fragments of disc or bone spurs are removed to make more room around the nerve then the skin is closed with sutures    The patient understands that there are inherent risks to surgery including bleeding, infection, anesthetic risk, death, heart attack, stroke, damage to nerves, weakness, numbness, paralysis, failure to improve, need for further surgery, CSF leak, recurrence, persistent pain, and any other unforeseen complications. They comprehend and had opportunity to ask further questions.    He told us he understands what we discussed that he is going to have to contact us  back regarding the option of surgery.      Regarding his diaphoresis and dizziness I suggest that he contact his primary physician as he just  weaned off steroids there may be some few explanations and treatment options for his symptomatology.    Return for follow-up after surgery.    Diagnoses and all orders for this visit:    1. Lumbar disc herniation with radiculopathy (Primary)  -     Case Request; Standing  -     ceFAZolin (ANCEF) 2 g in sodium chloride 0.9 % 100 mL IVPB  -     Case Request    Other orders  -     Follow Anesthesia Guidelines / Protocol; Future  -     Obtain informed consent  -     Provide NPO Instructions to Patient; Future  -     Follow Anesthesia Guidelines / Protocol; Standing  -     Verify NPO Status; Standing  -     Obtain Informed Consent; Standing  -     SCD (sequential compression device)- to be placed on patient in Pre-op; Standing  -     Instructions on Coughing, Deep Breathing & Incentive Spirometry; Standing             Curt Titus MD FACS [facility]  Neurological Surgery

## 2023-05-17 ENCOUNTER — APPOINTMENT (OUTPATIENT)
Dept: GENERAL RADIOLOGY | Facility: HOSPITAL | Age: 73
End: 2023-05-17
Payer: MEDICARE

## 2023-05-17 ENCOUNTER — ANESTHESIA EVENT (OUTPATIENT)
Dept: PERIOP | Facility: HOSPITAL | Age: 73
End: 2023-05-17
Payer: MEDICARE

## 2023-05-17 ENCOUNTER — HOSPITAL ENCOUNTER (OUTPATIENT)
Facility: HOSPITAL | Age: 73
Setting detail: HOSPITAL OUTPATIENT SURGERY
Discharge: HOME | End: 2023-05-17
Attending: NEUROLOGICAL SURGERY | Admitting: NEUROLOGICAL SURGERY
Payer: MEDICARE

## 2023-05-17 ENCOUNTER — ANESTHESIA (OUTPATIENT)
Dept: PERIOP | Facility: HOSPITAL | Age: 73
End: 2023-05-17
Payer: MEDICARE

## 2023-05-17 VITALS
SYSTOLIC BLOOD PRESSURE: 143 MMHG | RESPIRATION RATE: 16 BRPM | OXYGEN SATURATION: 100 % | TEMPERATURE: 97.5 F | DIASTOLIC BLOOD PRESSURE: 95 MMHG | HEART RATE: 88 BPM

## 2023-05-17 DIAGNOSIS — M51.16 LUMBAR DISC HERNIATION WITH RADICULOPATHY: ICD-10-CM

## 2023-05-17 PROCEDURE — 25010000002 CEFAZOLIN IN DEXTROSE 2-4 GM/100ML-% SOLUTION: Performed by: NEUROLOGICAL SURGERY

## 2023-05-17 PROCEDURE — 25010000002 DEXAMETHASONE PER 1 MG: Performed by: NURSE ANESTHETIST, CERTIFIED REGISTERED

## 2023-05-17 PROCEDURE — 63030 LAMOT DCMPRN NRV RT 1 LMBR: CPT | Performed by: NEUROLOGICAL SURGERY

## 2023-05-17 PROCEDURE — 25010000002 PROPOFOL 10 MG/ML EMULSION: Performed by: NURSE ANESTHETIST, CERTIFIED REGISTERED

## 2023-05-17 PROCEDURE — 25010000002 SUGAMMADEX 200 MG/2ML SOLUTION: Performed by: NURSE ANESTHETIST, CERTIFIED REGISTERED

## 2023-05-17 PROCEDURE — 25010000002 FENTANYL CITRATE (PF) 50 MCG/ML SOLUTION: Performed by: ANESTHESIOLOGY

## 2023-05-17 PROCEDURE — 25010000002 ONDANSETRON PER 1 MG: Performed by: NURSE ANESTHETIST, CERTIFIED REGISTERED

## 2023-05-17 PROCEDURE — 72020 X-RAY EXAM OF SPINE 1 VIEW: CPT

## 2023-05-17 PROCEDURE — 25010000002 MIDAZOLAM PER 1 MG: Performed by: ANESTHESIOLOGY

## 2023-05-17 PROCEDURE — 25010000002 FENTANYL CITRATE (PF) 50 MCG/ML SOLUTION: Performed by: NURSE ANESTHETIST, CERTIFIED REGISTERED

## 2023-05-17 DEVICE — FLOSEAL HEMOSTATIC MATRIX, 10ML
Type: IMPLANTABLE DEVICE | Site: SPINE LUMBAR | Status: FUNCTIONAL
Brand: FLOSEAL HEMOSTATIC MATRIX

## 2023-05-17 RX ORDER — HYDROCODONE BITARTRATE AND ACETAMINOPHEN 7.5; 325 MG/1; MG/1
1 TABLET ORAL EVERY 4 HOURS PRN
Status: DISCONTINUED | OUTPATIENT
Start: 2023-05-17 | End: 2023-05-17 | Stop reason: HOSPADM

## 2023-05-17 RX ORDER — SODIUM CHLORIDE, SODIUM LACTATE, POTASSIUM CHLORIDE, CALCIUM CHLORIDE 600; 310; 30; 20 MG/100ML; MG/100ML; MG/100ML; MG/100ML
9 INJECTION, SOLUTION INTRAVENOUS CONTINUOUS
Status: DISCONTINUED | OUTPATIENT
Start: 2023-05-17 | End: 2023-05-17 | Stop reason: HOSPADM

## 2023-05-17 RX ORDER — HYDROCODONE BITARTRATE AND ACETAMINOPHEN 7.5; 325 MG/1; MG/1
1 TABLET ORAL EVERY 6 HOURS PRN
Qty: 40 TABLET | Refills: 0 | Status: SHIPPED | OUTPATIENT
Start: 2023-05-17

## 2023-05-17 RX ORDER — PROMETHAZINE HYDROCHLORIDE 25 MG/1
25 SUPPOSITORY RECTAL ONCE AS NEEDED
Status: DISCONTINUED | OUTPATIENT
Start: 2023-05-17 | End: 2023-05-17 | Stop reason: HOSPADM

## 2023-05-17 RX ORDER — PROMETHAZINE HYDROCHLORIDE 25 MG/1
25 TABLET ORAL ONCE AS NEEDED
Status: DISCONTINUED | OUTPATIENT
Start: 2023-05-17 | End: 2023-05-17 | Stop reason: HOSPADM

## 2023-05-17 RX ORDER — NALOXONE HCL 0.4 MG/ML
0.4 VIAL (ML) INJECTION
Status: DISCONTINUED | OUTPATIENT
Start: 2023-05-17 | End: 2023-05-17 | Stop reason: HOSPADM

## 2023-05-17 RX ORDER — LIDOCAINE HYDROCHLORIDE 20 MG/ML
INJECTION, SOLUTION INFILTRATION; PERINEURAL AS NEEDED
Status: DISCONTINUED | OUTPATIENT
Start: 2023-05-17 | End: 2023-05-17 | Stop reason: SURG

## 2023-05-17 RX ORDER — HYDROCODONE BITARTRATE AND ACETAMINOPHEN 5; 325 MG/1; MG/1
1 TABLET ORAL ONCE AS NEEDED
Status: DISCONTINUED | OUTPATIENT
Start: 2023-05-17 | End: 2023-05-17 | Stop reason: HOSPADM

## 2023-05-17 RX ORDER — DROPERIDOL 2.5 MG/ML
0.62 INJECTION, SOLUTION INTRAMUSCULAR; INTRAVENOUS
Status: DISCONTINUED | OUTPATIENT
Start: 2023-05-17 | End: 2023-05-17 | Stop reason: HOSPADM

## 2023-05-17 RX ORDER — BUPIVACAINE HYDROCHLORIDE AND EPINEPHRINE 5; 5 MG/ML; UG/ML
INJECTION, SOLUTION EPIDURAL; INTRACAUDAL; PERINEURAL AS NEEDED
Status: DISCONTINUED | OUTPATIENT
Start: 2023-05-17 | End: 2023-05-17 | Stop reason: HOSPADM

## 2023-05-17 RX ORDER — FENTANYL CITRATE 50 UG/ML
INJECTION, SOLUTION INTRAMUSCULAR; INTRAVENOUS AS NEEDED
Status: DISCONTINUED | OUTPATIENT
Start: 2023-05-17 | End: 2023-05-17 | Stop reason: SURG

## 2023-05-17 RX ORDER — ONDANSETRON 2 MG/ML
4 INJECTION INTRAMUSCULAR; INTRAVENOUS EVERY 6 HOURS PRN
Status: DISCONTINUED | OUTPATIENT
Start: 2023-05-17 | End: 2023-05-17 | Stop reason: HOSPADM

## 2023-05-17 RX ORDER — ACETAMINOPHEN 325 MG/1
650 TABLET ORAL EVERY 4 HOURS PRN
Status: DISCONTINUED | OUTPATIENT
Start: 2023-05-17 | End: 2023-05-17 | Stop reason: HOSPADM

## 2023-05-17 RX ORDER — PHENYLEPHRINE HCL IN 0.9% NACL 1 MG/10 ML
SYRINGE (ML) INTRAVENOUS AS NEEDED
Status: DISCONTINUED | OUTPATIENT
Start: 2023-05-17 | End: 2023-05-17 | Stop reason: SURG

## 2023-05-17 RX ORDER — HYDRALAZINE HYDROCHLORIDE 20 MG/ML
5 INJECTION INTRAMUSCULAR; INTRAVENOUS
Status: DISCONTINUED | OUTPATIENT
Start: 2023-05-17 | End: 2023-05-17 | Stop reason: HOSPADM

## 2023-05-17 RX ORDER — FENTANYL CITRATE 50 UG/ML
50 INJECTION, SOLUTION INTRAMUSCULAR; INTRAVENOUS ONCE AS NEEDED
Status: COMPLETED | OUTPATIENT
Start: 2023-05-17 | End: 2023-05-17

## 2023-05-17 RX ORDER — SODIUM CHLORIDE 0.9 % (FLUSH) 0.9 %
3 SYRINGE (ML) INJECTION EVERY 12 HOURS SCHEDULED
Status: DISCONTINUED | OUTPATIENT
Start: 2023-05-17 | End: 2023-05-17 | Stop reason: HOSPADM

## 2023-05-17 RX ORDER — IPRATROPIUM BROMIDE AND ALBUTEROL SULFATE 2.5; .5 MG/3ML; MG/3ML
3 SOLUTION RESPIRATORY (INHALATION) ONCE AS NEEDED
Status: DISCONTINUED | OUTPATIENT
Start: 2023-05-17 | End: 2023-05-17 | Stop reason: HOSPADM

## 2023-05-17 RX ORDER — SODIUM CHLORIDE, SODIUM LACTATE, POTASSIUM CHLORIDE, CALCIUM CHLORIDE 600; 310; 30; 20 MG/100ML; MG/100ML; MG/100ML; MG/100ML
INJECTION, SOLUTION INTRAVENOUS CONTINUOUS PRN
Status: DISCONTINUED | OUTPATIENT
Start: 2023-05-17 | End: 2023-05-17 | Stop reason: SURG

## 2023-05-17 RX ORDER — FAMOTIDINE 10 MG/ML
20 INJECTION, SOLUTION INTRAVENOUS ONCE
Status: COMPLETED | OUTPATIENT
Start: 2023-05-17 | End: 2023-05-17

## 2023-05-17 RX ORDER — HYDROCODONE BITARTRATE AND ACETAMINOPHEN 7.5; 325 MG/1; MG/1
2 TABLET ORAL EVERY 4 HOURS PRN
Status: DISCONTINUED | OUTPATIENT
Start: 2023-05-17 | End: 2023-05-17 | Stop reason: HOSPADM

## 2023-05-17 RX ORDER — SODIUM CHLORIDE 0.9 % (FLUSH) 0.9 %
3-10 SYRINGE (ML) INJECTION AS NEEDED
Status: DISCONTINUED | OUTPATIENT
Start: 2023-05-17 | End: 2023-05-17 | Stop reason: HOSPADM

## 2023-05-17 RX ORDER — MIDAZOLAM HYDROCHLORIDE 1 MG/ML
0.5 INJECTION INTRAMUSCULAR; INTRAVENOUS
Status: DISCONTINUED | OUTPATIENT
Start: 2023-05-17 | End: 2023-05-17 | Stop reason: HOSPADM

## 2023-05-17 RX ORDER — HYDROMORPHONE HYDROCHLORIDE 1 MG/ML
0.25 INJECTION, SOLUTION INTRAMUSCULAR; INTRAVENOUS; SUBCUTANEOUS
Status: DISCONTINUED | OUTPATIENT
Start: 2023-05-17 | End: 2023-05-17 | Stop reason: HOSPADM

## 2023-05-17 RX ORDER — LIDOCAINE HYDROCHLORIDE 10 MG/ML
0.5 INJECTION, SOLUTION EPIDURAL; INFILTRATION; INTRACAUDAL; PERINEURAL ONCE AS NEEDED
Status: DISCONTINUED | OUTPATIENT
Start: 2023-05-17 | End: 2023-05-17 | Stop reason: HOSPADM

## 2023-05-17 RX ORDER — CYCLOBENZAPRINE HCL 5 MG
5-10 TABLET ORAL 3 TIMES DAILY PRN
Qty: 60 TABLET | Refills: 2 | Status: SHIPPED | OUTPATIENT
Start: 2023-05-17

## 2023-05-17 RX ORDER — EPHEDRINE SULFATE 50 MG/ML
5 INJECTION, SOLUTION INTRAVENOUS ONCE AS NEEDED
Status: DISCONTINUED | OUTPATIENT
Start: 2023-05-17 | End: 2023-05-17 | Stop reason: HOSPADM

## 2023-05-17 RX ORDER — MORPHINE SULFATE 2 MG/ML
2 INJECTION, SOLUTION INTRAMUSCULAR; INTRAVENOUS EVERY 4 HOURS PRN
Status: DISCONTINUED | OUTPATIENT
Start: 2023-05-17 | End: 2023-05-17 | Stop reason: HOSPADM

## 2023-05-17 RX ORDER — CYCLOBENZAPRINE HCL 10 MG
5 TABLET ORAL 3 TIMES DAILY PRN
Status: DISCONTINUED | OUTPATIENT
Start: 2023-05-17 | End: 2023-05-17 | Stop reason: HOSPADM

## 2023-05-17 RX ORDER — SODIUM CHLORIDE 0.9 % (FLUSH) 0.9 %
10 SYRINGE (ML) INJECTION AS NEEDED
Status: DISCONTINUED | OUTPATIENT
Start: 2023-05-17 | End: 2023-05-17 | Stop reason: HOSPADM

## 2023-05-17 RX ORDER — ONDANSETRON 2 MG/ML
4 INJECTION INTRAMUSCULAR; INTRAVENOUS ONCE AS NEEDED
Status: COMPLETED | OUTPATIENT
Start: 2023-05-17 | End: 2023-05-17

## 2023-05-17 RX ORDER — FENTANYL CITRATE 50 UG/ML
25 INJECTION, SOLUTION INTRAMUSCULAR; INTRAVENOUS
Status: DISCONTINUED | OUTPATIENT
Start: 2023-05-17 | End: 2023-05-17 | Stop reason: HOSPADM

## 2023-05-17 RX ORDER — AMOXICILLIN 250 MG
1 CAPSULE ORAL NIGHTLY PRN
Status: DISCONTINUED | OUTPATIENT
Start: 2023-05-17 | End: 2023-05-17 | Stop reason: HOSPADM

## 2023-05-17 RX ORDER — SODIUM CHLORIDE 9 MG/ML
40 INJECTION, SOLUTION INTRAVENOUS AS NEEDED
Status: DISCONTINUED | OUTPATIENT
Start: 2023-05-17 | End: 2023-05-17 | Stop reason: HOSPADM

## 2023-05-17 RX ORDER — SODIUM CHLORIDE AND POTASSIUM CHLORIDE 150; 900 MG/100ML; MG/100ML
75 INJECTION, SOLUTION INTRAVENOUS CONTINUOUS
Status: DISCONTINUED | OUTPATIENT
Start: 2023-05-17 | End: 2023-05-17 | Stop reason: HOSPADM

## 2023-05-17 RX ORDER — DEXAMETHASONE SODIUM PHOSPHATE 4 MG/ML
INJECTION, SOLUTION INTRA-ARTICULAR; INTRALESIONAL; INTRAMUSCULAR; INTRAVENOUS; SOFT TISSUE AS NEEDED
Status: DISCONTINUED | OUTPATIENT
Start: 2023-05-17 | End: 2023-05-17 | Stop reason: SURG

## 2023-05-17 RX ORDER — LABETALOL HYDROCHLORIDE 5 MG/ML
5 INJECTION, SOLUTION INTRAVENOUS
Status: DISCONTINUED | OUTPATIENT
Start: 2023-05-17 | End: 2023-05-17 | Stop reason: HOSPADM

## 2023-05-17 RX ORDER — ROCURONIUM BROMIDE 10 MG/ML
INJECTION, SOLUTION INTRAVENOUS AS NEEDED
Status: DISCONTINUED | OUTPATIENT
Start: 2023-05-17 | End: 2023-05-17 | Stop reason: SURG

## 2023-05-17 RX ORDER — GLYCOPYRROLATE 0.2 MG/ML
INJECTION INTRAMUSCULAR; INTRAVENOUS AS NEEDED
Status: DISCONTINUED | OUTPATIENT
Start: 2023-05-17 | End: 2023-05-17 | Stop reason: SURG

## 2023-05-17 RX ORDER — KETOROLAC TROMETHAMINE 30 MG/ML
15 INJECTION, SOLUTION INTRAMUSCULAR; INTRAVENOUS EVERY 6 HOURS PRN
Status: DISCONTINUED | OUTPATIENT
Start: 2023-05-17 | End: 2023-05-17 | Stop reason: HOSPADM

## 2023-05-17 RX ORDER — DIPHENHYDRAMINE HYDROCHLORIDE 50 MG/ML
12.5 INJECTION INTRAMUSCULAR; INTRAVENOUS
Status: DISCONTINUED | OUTPATIENT
Start: 2023-05-17 | End: 2023-05-17 | Stop reason: HOSPADM

## 2023-05-17 RX ORDER — ONDANSETRON 4 MG/1
4 TABLET, FILM COATED ORAL EVERY 6 HOURS PRN
Status: DISCONTINUED | OUTPATIENT
Start: 2023-05-17 | End: 2023-05-17 | Stop reason: HOSPADM

## 2023-05-17 RX ORDER — CEFAZOLIN SODIUM 2 G/100ML
2 INJECTION, SOLUTION INTRAVENOUS ONCE
Status: COMPLETED | OUTPATIENT
Start: 2023-05-17 | End: 2023-05-17

## 2023-05-17 RX ORDER — NALOXONE HCL 0.4 MG/ML
0.2 VIAL (ML) INJECTION AS NEEDED
Status: DISCONTINUED | OUTPATIENT
Start: 2023-05-17 | End: 2023-05-17 | Stop reason: HOSPADM

## 2023-05-17 RX ORDER — ONDANSETRON 2 MG/ML
INJECTION INTRAMUSCULAR; INTRAVENOUS AS NEEDED
Status: DISCONTINUED | OUTPATIENT
Start: 2023-05-17 | End: 2023-05-17 | Stop reason: SURG

## 2023-05-17 RX ORDER — PROPOFOL 10 MG/ML
VIAL (ML) INTRAVENOUS AS NEEDED
Status: DISCONTINUED | OUTPATIENT
Start: 2023-05-17 | End: 2023-05-17 | Stop reason: SURG

## 2023-05-17 RX ORDER — FLUMAZENIL 0.1 MG/ML
0.2 INJECTION INTRAVENOUS AS NEEDED
Status: DISCONTINUED | OUTPATIENT
Start: 2023-05-17 | End: 2023-05-17 | Stop reason: HOSPADM

## 2023-05-17 RX ADMIN — ONDANSETRON 4 MG: 2 INJECTION INTRAMUSCULAR; INTRAVENOUS at 12:30

## 2023-05-17 RX ADMIN — ONDANSETRON 4 MG: 2 INJECTION INTRAMUSCULAR; INTRAVENOUS at 11:41

## 2023-05-17 RX ADMIN — ROCURONIUM BROMIDE 50 MG: 10 INJECTION, SOLUTION INTRAVENOUS at 10:18

## 2023-05-17 RX ADMIN — FENTANYL CITRATE 25 MCG: 50 INJECTION, SOLUTION INTRAMUSCULAR; INTRAVENOUS at 10:18

## 2023-05-17 RX ADMIN — FAMOTIDINE 20 MG: 10 INJECTION INTRAVENOUS at 09:41

## 2023-05-17 RX ADMIN — Medication 100 MCG: at 10:52

## 2023-05-17 RX ADMIN — Medication 200 MCG: at 10:57

## 2023-05-17 RX ADMIN — FENTANYL CITRATE 50 MCG: 50 INJECTION, SOLUTION INTRAMUSCULAR; INTRAVENOUS at 10:26

## 2023-05-17 RX ADMIN — FENTANYL CITRATE 50 MCG: 50 INJECTION, SOLUTION INTRAMUSCULAR; INTRAVENOUS at 09:41

## 2023-05-17 RX ADMIN — LIDOCAINE HYDROCHLORIDE 60 MG: 20 INJECTION, SOLUTION INFILTRATION; PERINEURAL at 10:18

## 2023-05-17 RX ADMIN — PROPOFOL 150 MG: 10 INJECTION, EMULSION INTRAVENOUS at 10:18

## 2023-05-17 RX ADMIN — CEFAZOLIN SODIUM 2 G: 2 INJECTION, SOLUTION INTRAVENOUS at 10:08

## 2023-05-17 RX ADMIN — SODIUM CHLORIDE, POTASSIUM CHLORIDE, SODIUM LACTATE AND CALCIUM CHLORIDE 9 ML/HR: 600; 310; 30; 20 INJECTION, SOLUTION INTRAVENOUS at 09:34

## 2023-05-17 RX ADMIN — GLYCOPYRROLATE 0.1 MG: 1 INJECTION INTRAMUSCULAR; INTRAVENOUS at 10:46

## 2023-05-17 RX ADMIN — DEXAMETHASONE SODIUM PHOSPHATE 6 MG: 4 INJECTION, SOLUTION INTRA-ARTICULAR; INTRALESIONAL; INTRAMUSCULAR; INTRAVENOUS; SOFT TISSUE at 10:46

## 2023-05-17 RX ADMIN — MIDAZOLAM 0.5 MG: 1 INJECTION INTRAMUSCULAR; INTRAVENOUS at 09:41

## 2023-05-17 RX ADMIN — HYDROCODONE BITARTRATE AND ACETAMINOPHEN 1 TABLET: 7.5; 325 TABLET ORAL at 12:30

## 2023-05-17 RX ADMIN — SUGAMMADEX 200 MG: 100 INJECTION, SOLUTION INTRAVENOUS at 11:53

## 2023-05-17 RX ADMIN — FENTANYL CITRATE 25 MCG: 50 INJECTION, SOLUTION INTRAMUSCULAR; INTRAVENOUS at 11:40

## 2023-05-17 RX ADMIN — SODIUM CHLORIDE, POTASSIUM CHLORIDE, SODIUM LACTATE AND CALCIUM CHLORIDE: 600; 310; 30; 20 INJECTION, SOLUTION INTRAVENOUS at 10:13

## 2023-05-17 NOTE — ADDENDUM NOTE
Addendum  created 05/17/23 1326 by Jimbo Lau MD    Attestation recorded in Intraprocedure, Intraprocedure Attestations filed

## 2023-05-17 NOTE — ANESTHESIA POSTPROCEDURE EVALUATION
Patient: Heriberto Macario Jr.    Procedure Summary     Date: 05/17/23 Room / Location: [facility] OR [address] [place] [address] MAIN OR    Anesthesia Start: 1013 Anesthesia Stop: 1207    Procedure: Lumbar Microscopic Discectomy, right lumbar 4 lumbar 5 (Right: Spine Lumbar) Diagnosis:       Lumbar disc herniation with radiculopathy      (Lumbar disc herniation with radiculopathy [M51.16])    Surgeons: Curt Titus MD Provider: Jimbo Lau MD    Anesthesia Type: general ASA Status: 2          Anesthesia Type: general    Vitals  Vitals Value Taken Time   /83 05/17/23 1246   Temp 36.4 °C (97.5 °F) 05/17/23 1203   Pulse 82 05/17/23 1254   Resp 16 05/17/23 1240   SpO2 97 % 05/17/23 1254   Vitals shown include unvalidated device data.        Post Anesthesia Care and Evaluation    Patient location during evaluation: bedside  Patient participation: complete - patient participated  Level of consciousness: awake and alert  Pain management: adequate    Airway patency: patent  Anesthetic complications: No anesthetic complications    Cardiovascular status: acceptable  Respiratory status: acceptable  Hydration status: acceptable    Comments: /83   Pulse 88   Temp 36.4 °C (97.5 °F) (Oral)   Resp 16   SpO2 97%

## 2023-05-17 NOTE — OP NOTE
Lumbar Microscopic Discectomy Procedure Note    Heriberto Macario .  5/17/2023  [id number]    SURGEON  Curt Titus MD    Assistant:  Micaela Cummings CSA was present throughout the entire surgery to provide intraoperative suction, retraction, suturing, and irrigation to promote visualization by the operative surgeon and assist with opening and closure.  The skilled assistance was necessary for the success of this case.  Our practice does not have a relationship with neurosurgical residents.    Indication:Intractable leg pain, L5 radiculopathy on the right    Pre-op Diagnosis:   Lumbar Herniated Nucleus Pulposus, L4-L5 on the Right    Post-op Diagnosis:     Lumbar Herniated Nucleus Pulposus, L4-L5 on the Right    Procedure Performed:  Lumbar Microscopic Discectomy, L4-L5 on the Right    1.  Laminotomy (Hemilaminectomy), with decompression of nerve root, including partial facetectomy, foraminotomy and excision of herniated intervertebral disc, L4-L5 on the Right.    2. Intraoperative Microdissection    Spinal Surgery Levels Completed:1 Level     Anesthesia: General    Staff:   Circulator: José Manuel Patel RN  Scrub Person: Francie Booth  Assistant: Shanae Lovett CSA    Estimated Blood Loss: minimal    Specimens: None     Drains: None    Findings: Large extruded fragment of disc under the annulus of the disc extending below the level of the disc impinging the L5 nerve root as it was traversing the L5 pedicle, after removal of the disc extrusion no annular defect could be palpated suggesting it had already healed over or was more medial than could be reached    Complications: None immediate    Narrative Description:     Positioning:  After operative consent, the patient was taken to the operating room in stable condition and placed under general endotracheal intubation. Once adequate anesthesia was administered the patient was placed in the prone position on a Jamar frame, then the frame cranked into a  flexion position. After adequate prepping and draping, needle localization of the L4-L5 level was confirmed and the skin was infiltrated with local anesthesia.    Approach:  The skin incision was taken down to the superficial fascia which was then incised with the Bovie electrocautery. A subperiosteal dissection was performed down to the level of the facet joints. A Gill self-retaining retractor was placed in position on the right.    Operating Microscope: The operating microscope was draped using sterile technique and brought into the field and the rest of the operation was performed under operative magnification with microdissection technique and micro-illumination with the aid of the operating microscope.    Lumbar decompression:  The laminotomy was performed at L4 on the right with a pneumatic drill. The ligamentum flavum was resected with a Kerrison ronguer. The L5 nerve root was now decompressed and the microscope was now used for microdissection. Gentle palpation with a nerve hook along the L5 nerve root confirmed impingement as the nerve was traversing the L5 pedicle due to a large disc extrusion at and below the level of the disc space on the right in the subannular compartment and this large disc extrusion was removed from the right. There was some bony stenosis on the right at L4-L5 neuroforamen and through a discectomy approach the foraminotomy was complete. The wound was irrigated and the Valsalva maneuver did not express CSF. The Rios ball probe passed freely along the L4 and L5 nerve roots. Hemostasis was complete with Flow-Seal and bipolar electrocautery.    Closure:  The fascia was now closed with 0 Vicryl suture. The superficial subcutaneous tissues closed with 2-0 Vicryl suture and the skin with 3-0 Vicryl suture in a running subcuticular fashion. Iodine impregnated Steri-strips and a dry dressing was applied. The patient arose from anesthesia in stable condition and was transported to  postop recovery.    Curt Titus MD     Date: 5/17/2023  Time: 12:15 EDT

## 2023-05-17 NOTE — DISCHARGE INSTRUCTIONS
[facility]  [address], [address]  [place] [address]  Phone:  [phone]  Fax:  [phone]    Dr. Curt Titus MD FACS [facility]            Low Back Surgery Post-Operative Instructions  (Lumbar Discectomy or Lumbar Decompression)        Change your bandage in about 24 hours following surgery and you may replace with another guaze bandage or leave it off.  Check the cut (incision) made by the surgeon twice a day for signs of infection.  Some signs of infection may include:  A foul smelling, greenish or yellowish discharge form the wound.  Increased pain  Increased redness over the incision (operative) site  Skin edges separate  Flu-like symptoms (problems)  A temperature above 101.5° F (38.6° C) .    You may resume normal diet as you tolerate    No lifting overhead, although you may place your arms above your head.  DO NOT lift anything over five (5) pounds.  Walking is allowed and encouraged. There are no restrictions on sitting or climbing stairs, but refrain from overly strenuous activity.  You may notice that a constant position (standing or sitting) greater than 45 minutes may tend to make you more sore and therefore it is recommended that you change positions frequently. Do not drive until you are seen back for your first postoperative visit, although you may be a passenger in a car.      Walking is permitted.  You may use a treadmill without an incline.  Back off on activity if you have discomfort.  You may also use stairs as much as you can tolerate, just take it slow and easy.    Refrain from any sexual activity until after your first evaluation at the office.     Back pain after surgery may worsen 2 to 3 days following surgery.  It should smooth out after the third day.  Continue the pain medication and muscle relaxers as prescribed.  You may also need to take a stool softener like Senokot-S if you develop constipation.    You may shower and pat the incision dry, but do not soak  your wound in water such as a swimming pool, hot tub or lake.   Avoid direct sunlight to the wound for at least three (3) months.  You may apply ice to the surgical site for 15 to 20 minutes each hour while awake for the first few days following surgery.  Simply put the ice in a plastic bag in place a towel between the bag of ice and your skin.    You have Steri-Strips applied to the skin edges of your incision.  They should fall off in 7 to 10 days, however, if they do not fall off by the 10th day you may remove them.    You will leave the hospital with prescriptions for pain medicine and a muscle relaxer.  These medications must be taken as prescribed only.  If a refill of your pain medication is required, in order to have this medication refilled, you must contact the office 3 days (72 hrs) prior to running out, but the amount prescribed is generally enough to last until the first post-operative visit.      A small amount of bleeding from the incision during the first few days is not unusual.       You should have a post-operative visit approximately 2 weeks after surgery.  If you do not have a scheduled appointment, call the office at [phone] to schedule one.  We will discuss return to work at your first post-operative visit, but you can expect to be off of work for an average of 6 weeks.     Notify the office if there are any problems, such as:    Excessive back or leg pain   If you lose control of urine or bowel movements  Persistent temperature of 101.5° F (38.6° C) or greater that is not relieved by Tylenol.  Excessive bleeding, redness, heat, leakage of fluid, swelling or pus around the incision   If you develop difficulty breathing, have chest pain or shortness of breath, call 911.  If you develop swelling in the calf or leg  Your pain is not controlled with medication  You seem to be getting worse rather than better    Thank you.

## 2023-05-17 NOTE — ANESTHESIA PROCEDURE NOTES
Airway  Urgency: elective    Date/Time: 5/17/2023 10:23 AM  Airway not difficult    General Information and Staff    Patient location during procedure: OR    Indications and Patient Condition  Indications for airway management: airway protection    Preoxygenated: yes  Mask difficulty assessment: 2 - vent by mask + OA or adjuvant +/- NMBA    Final Airway Details  Final airway type: endotracheal airway      Successful airway: ETT  Cuffed: yes   Successful intubation technique: direct laryngoscopy  Facilitating devices/methods: intubating stylet  Endotracheal tube insertion site: oral  Blade: Antonieta  Blade size: 4  ETT size (mm): 7.5  Cormack-Lehane Classification: grade IIb - view of arytenoids or posterior of glottis only  Placement verified by: chest auscultation and capnometry   Measured from: lips  ETT/EBT  to lips (cm): 23  Number of attempts at approach: 1  Assessment: lips, teeth, and gum same as pre-op and atraumatic intubation

## 2023-05-30 ENCOUNTER — OFFICE VISIT (OUTPATIENT)
Dept: NEUROSURGERY | Facility: CLINIC | Age: 73
End: 2023-05-30

## 2023-05-30 VITALS
HEIGHT: 72 IN | BODY MASS INDEX: 27.22 KG/M2 | SYSTOLIC BLOOD PRESSURE: 118 MMHG | DIASTOLIC BLOOD PRESSURE: 72 MMHG | WEIGHT: 201 LBS

## 2023-05-30 DIAGNOSIS — Z98.890 STATUS POST LUMBAR SURGERY: Primary | ICD-10-CM

## 2023-05-30 DIAGNOSIS — M51.16 LUMBAR DISC HERNIATION WITH RADICULOPATHY: ICD-10-CM

## 2023-05-30 PROCEDURE — 1160F RVW MEDS BY RX/DR IN RCRD: CPT | Performed by: NEUROLOGICAL SURGERY

## 2023-05-30 PROCEDURE — 99024 POSTOP FOLLOW-UP VISIT: CPT | Performed by: NEUROLOGICAL SURGERY

## 2023-05-30 PROCEDURE — 1159F MED LIST DOCD IN RCRD: CPT | Performed by: NEUROLOGICAL SURGERY

## 2023-05-30 NOTE — PROGRESS NOTES
"Subjective   Patient ID: Heriberto Macario Jr. is a 72 y.o. male is here today for post op.    Patient reports traumatic improvement and even became emotional telling me how good he feels following the nerve root decompression.  His only residual symptom is some numbness in the large toe side of the foot.  He denies any pain at all and has no weakness.    History of Present Illness    Review of Systems   Musculoskeletal: Negative for back pain.   Neurological: Positive for numbness. Negative for weakness.       Objective     Vitals:    05/30/23 0957   BP: 118/72   BP Location: Left arm   Patient Position: Sitting   Cuff Size: Adult   Weight: 91.2 kg (201 lb)   Height: 182.9 cm (72.01\")   PainSc: 0-No pain     Body mass index is 27.25 kg/m².    Tobacco Use: Medium Risk   • Smoking Tobacco Use: Former   • Smokeless Tobacco Use: Never   • Passive Exposure: Not on file          Physical Exam  Neurologic Exam    Physical Exam:    CONSTITUTIONAL:  appears well developed, well-nourished and in no acute distress.    NECK: the neck is supple and symmetric. The trachea is midline with no masses.      PULMONARY: Respiratory effort is normal with no increased work of breathing or signs of respiratory distress.    CARDIOVASCULAR: Pedal pulses are +2/4 bilaterally. Examination of the extremities shows no edema or varicosities.    MUSCULOSKELETAL: Gait normal    SKIN: The skin is warm, dry and intact.  Lumbar incision healing well without erythema, induration or drainage    NEUROLOGIC:   Normal motor strength noted. Muscle bulk and tone are normal.  Sensory exam is diminished in the large toe of the right foot  Trailer raising test negative bilaterally  Cortical function is intact and without deficits. Speech is normal.    PSYCHIATRIC: oriented to person, place and time. Patient's mood and affect are normal.    Assessment & Plan     Medical Decision Making:      The patient can now lift up to 25 lbs. and may soak the incision in a " bath or pool if desired. They will arrange Physical Therapy 2-3 times per week for 3 weeks to initiate a home exercise plan. It is safe for the patient to drive if they are comfortable driving and not consuming narcotic pain medications. Follow up is arranged following PT. All questions were reviewed and answered.     Diagnoses and all orders for this visit:    1. Status post lumbar surgery (Primary)  -     Ambulatory Referral to Physical Therapy Evaluate and treat    2. Lumbar disc herniation with radiculopathy  -     Ambulatory Referral to Physical Therapy Evaluate and treat      Return in about 4 weeks (around 6/27/2023) for discussion of Physical Therapy results.

## 2024-02-11 ENCOUNTER — APPOINTMENT (OUTPATIENT)
Dept: CT IMAGING | Facility: HOSPITAL | Age: 74
End: 2024-02-11
Payer: MEDICARE

## 2024-02-11 ENCOUNTER — HOSPITAL ENCOUNTER (EMERGENCY)
Facility: HOSPITAL | Age: 74
Discharge: HOME | End: 2024-02-11
Attending: EMERGENCY MEDICINE | Admitting: EMERGENCY MEDICINE
Payer: MEDICARE

## 2024-02-11 VITALS
DIASTOLIC BLOOD PRESSURE: 78 MMHG | BODY MASS INDEX: 27.22 KG/M2 | TEMPERATURE: 97.7 F | WEIGHT: 201 LBS | HEART RATE: 88 BPM | HEIGHT: 72 IN | RESPIRATION RATE: 16 BRPM | SYSTOLIC BLOOD PRESSURE: 136 MMHG | OXYGEN SATURATION: 95 %

## 2024-02-11 DIAGNOSIS — K57.92 DIVERTICULITIS: Primary | ICD-10-CM

## 2024-02-11 LAB
ALBUMIN SERPL-MCNC: 4.5 G/DL (ref 3.5–5.2)
ALBUMIN/GLOB SERPL: 2 G/DL
ALP SERPL-CCNC: 69 U/L (ref 39–117)
ALT SERPL W P-5'-P-CCNC: 33 U/L (ref 1–41)
ANION GAP SERPL CALCULATED.3IONS-SCNC: 10 MMOL/L (ref 5–15)
AST SERPL-CCNC: 38 U/L (ref 1–40)
BASOPHILS # BLD AUTO: 0.04 10*3/MM3 (ref 0–0.2)
BASOPHILS NFR BLD AUTO: 0.3 % (ref 0–1.5)
BILIRUB SERPL-MCNC: 1.3 MG/DL (ref 0–1.2)
BILIRUB UR QL STRIP: NEGATIVE
BUN SERPL-MCNC: 13 MG/DL (ref 8–23)
BUN/CREAT SERPL: 14.1 (ref 7–25)
CALCIUM SPEC-SCNC: 9.2 MG/DL (ref 8.6–10.5)
CHLORIDE SERPL-SCNC: 106 MMOL/L (ref 98–107)
CLARITY UR: ABNORMAL
CO2 SERPL-SCNC: 23 MMOL/L (ref 22–29)
COLOR UR: ABNORMAL
CREAT SERPL-MCNC: 0.92 MG/DL (ref 0.76–1.27)
DEPRECATED RDW RBC AUTO: 41.4 FL (ref 37–54)
EGFRCR SERPLBLD CKD-EPI 2021: 87.3 ML/MIN/1.73
EOSINOPHIL # BLD AUTO: 0.15 10*3/MM3 (ref 0–0.4)
EOSINOPHIL NFR BLD AUTO: 1 % (ref 0.3–6.2)
ERYTHROCYTE [DISTWIDTH] IN BLOOD BY AUTOMATED COUNT: 12.4 % (ref 12.3–15.4)
GLOBULIN UR ELPH-MCNC: 2.3 GM/DL
GLUCOSE SERPL-MCNC: 124 MG/DL (ref 65–99)
GLUCOSE UR STRIP-MCNC: NEGATIVE MG/DL
HCT VFR BLD AUTO: 44 % (ref 37.5–51)
HGB BLD-MCNC: 14.7 G/DL (ref 13–17.7)
HGB UR QL STRIP.AUTO: NEGATIVE
IMM GRANULOCYTES # BLD AUTO: 0.06 10*3/MM3 (ref 0–0.05)
IMM GRANULOCYTES NFR BLD AUTO: 0.4 % (ref 0–0.5)
KETONES UR QL STRIP: ABNORMAL
LEUKOCYTE ESTERASE UR QL STRIP.AUTO: NEGATIVE
LIPASE SERPL-CCNC: 19 U/L (ref 13–60)
LYMPHOCYTES # BLD AUTO: 1.65 10*3/MM3 (ref 0.7–3.1)
LYMPHOCYTES NFR BLD AUTO: 11 % (ref 19.6–45.3)
MCH RBC QN AUTO: 30.5 PG (ref 26.6–33)
MCHC RBC AUTO-ENTMCNC: 33.4 G/DL (ref 31.5–35.7)
MCV RBC AUTO: 91.3 FL (ref 79–97)
MONOCYTES # BLD AUTO: 1.34 10*3/MM3 (ref 0.1–0.9)
MONOCYTES NFR BLD AUTO: 8.9 % (ref 5–12)
NEUTROPHILS NFR BLD AUTO: 11.82 10*3/MM3 (ref 1.7–7)
NEUTROPHILS NFR BLD AUTO: 78.4 % (ref 42.7–76)
NITRITE UR QL STRIP: NEGATIVE
NRBC BLD AUTO-RTO: 0 /100 WBC (ref 0–0.2)
PH UR STRIP.AUTO: 8.5 [PH] (ref 5–8)
PLATELET # BLD AUTO: 271 10*3/MM3 (ref 140–450)
PMV BLD AUTO: 9.4 FL (ref 6–12)
POTASSIUM SERPL-SCNC: 4.1 MMOL/L (ref 3.5–5.2)
PROT SERPL-MCNC: 6.8 G/DL (ref 6–8.5)
PROT UR QL STRIP: ABNORMAL
RBC # BLD AUTO: 4.82 10*6/MM3 (ref 4.14–5.8)
SODIUM SERPL-SCNC: 139 MMOL/L (ref 136–145)
SP GR UR STRIP: 1.03 (ref 1–1.03)
UROBILINOGEN UR QL STRIP: ABNORMAL
WBC NRBC COR # BLD AUTO: 15.06 10*3/MM3 (ref 3.4–10.8)

## 2024-02-11 PROCEDURE — 85025 COMPLETE CBC W/AUTO DIFF WBC: CPT | Performed by: PHYSICIAN ASSISTANT

## 2024-02-11 PROCEDURE — 99284 EMERGENCY DEPT VISIT MOD MDM: CPT

## 2024-02-11 PROCEDURE — 80053 COMPREHEN METABOLIC PANEL: CPT | Performed by: PHYSICIAN ASSISTANT

## 2024-02-11 PROCEDURE — 83690 ASSAY OF LIPASE: CPT | Performed by: PHYSICIAN ASSISTANT

## 2024-02-11 PROCEDURE — 81003 URINALYSIS AUTO W/O SCOPE: CPT | Performed by: PHYSICIAN ASSISTANT

## 2024-02-11 PROCEDURE — 96365 THER/PROPH/DIAG IV INF INIT: CPT

## 2024-02-11 PROCEDURE — 25010000002 PIPERACILLIN SOD-TAZOBACTAM PER 1 G: Performed by: PHYSICIAN ASSISTANT

## 2024-02-11 PROCEDURE — 74176 CT ABD & PELVIS W/O CONTRAST: CPT

## 2024-02-11 RX ORDER — AMOXICILLIN AND CLAVULANATE POTASSIUM 875; 125 MG/1; MG/1
1 TABLET, FILM COATED ORAL 2 TIMES DAILY
Qty: 20 TABLET | Refills: 0 | Status: SHIPPED | OUTPATIENT
Start: 2024-02-11 | End: 2024-02-21

## 2024-02-11 RX ORDER — AMOXICILLIN AND CLAVULANATE POTASSIUM 875; 125 MG/1; MG/1
1 TABLET, FILM COATED ORAL 2 TIMES DAILY
Qty: 20 TABLET | Refills: 0 | Status: SHIPPED | OUTPATIENT
Start: 2024-02-11 | End: 2024-02-11 | Stop reason: SDUPTHER

## 2024-02-11 RX ORDER — HYDROCODONE BITARTRATE AND ACETAMINOPHEN 5; 325 MG/1; MG/1
1 TABLET ORAL EVERY 6 HOURS PRN
Qty: 10 TABLET | Refills: 0 | Status: SHIPPED | OUTPATIENT
Start: 2024-02-11

## 2024-02-11 RX ADMIN — PIPERACILLIN SODIUM AND TAZOBACTAM SODIUM 3.38 G: 3; .375 INJECTION, SOLUTION INTRAVENOUS at 11:58

## 2024-02-15 ENCOUNTER — TELEPHONE (OUTPATIENT)
Dept: CASE MANAGEMENT | Facility: OTHER | Age: 74
End: 2024-02-15
Payer: MEDICARE

## 2024-02-16 ENCOUNTER — TELEPHONE (OUTPATIENT)
Dept: CASE MANAGEMENT | Facility: OTHER | Age: 74
End: 2024-02-16
Payer: MEDICARE

## 2024-02-20 ENCOUNTER — TELEPHONE (OUTPATIENT)
Dept: CASE MANAGEMENT | Facility: OTHER | Age: 74
End: 2024-02-20
Payer: MEDICARE

## 2024-10-23 ENCOUNTER — TELEPHONE (OUTPATIENT)
Dept: GASTROENTEROLOGY | Facility: CLINIC | Age: 74
End: 2024-10-23
Payer: MEDICARE

## 2024-10-23 DIAGNOSIS — K63.5 POLYP OF COLON, UNSPECIFIED PART OF COLON, UNSPECIFIED TYPE: ICD-10-CM

## 2024-10-23 DIAGNOSIS — K22.70 BARRETT'S ESOPHAGUS WITHOUT DYSPLASIA: Primary | ICD-10-CM

## 2024-10-23 RX ORDER — SODIUM CHLORIDE, SODIUM LACTATE, POTASSIUM CHLORIDE, CALCIUM CHLORIDE 600; 310; 30; 20 MG/100ML; MG/100ML; MG/100ML; MG/100ML
30 INJECTION, SOLUTION INTRAVENOUS CONTINUOUS
OUTPATIENT
Start: 2024-10-23 | End: 2024-10-24

## 2024-10-23 NOTE — TELEPHONE ENCOUNTER
LAST C/S  1/14/20   IN EPIC     PERSONAL HX OF POLYPS     FAMILY HX OF POLYPS     FAMILY HX OF COLON CA    NO ASA OR BLOOD THINNERS              LIST OF  MEDICATIONS  ALEVE  NAPROXEN  SUMATRIPTAN        OA QUESTIONNAIRE SCANNED IN MEDIA

## 2024-10-24 ENCOUNTER — TELEPHONE (OUTPATIENT)
Dept: GASTROENTEROLOGY | Facility: CLINIC | Age: 74
End: 2024-10-24
Payer: MEDICARE

## (undated) DEVICE — PK NEURO SPINE 40

## (undated) DEVICE — ACCY PA700 LUBRICANT DIFFUSER MR7 4 PACK: Brand: MIDAS REX

## (undated) DEVICE — SYR CONTRL PRESS/LO FIX/M/LL W/THMB/RNG 10ML

## (undated) DEVICE — 3M™ STERI-STRIP™ ANTIMICROBIAL SKIN CLOSURES 1/2 INCH X 4 INCHES 50/CARTON 4 CARTONS/CASE A1847: Brand: 3M™ STERI-STRIP™

## (undated) DEVICE — BITEBLOCK OMNI BLOC

## (undated) DEVICE — GLV SURG BIOGEL LTX PF 8

## (undated) DEVICE — GLV SURG SIGNATURE ESSENTIAL PF LTX SZ7.5

## (undated) DEVICE — TUBING, SUCTION, 1/4" X 10', STRAIGHT: Brand: MEDLINE

## (undated) DEVICE — ANTIBACTERIAL UNDYED BRAIDED (POLYGLACTIN 910), SYNTHETIC ABSORBABLE SUTURE: Brand: COATED VICRYL

## (undated) DEVICE — APPL CHLORAPREP HI/LITE 26ML ORNG

## (undated) DEVICE — PENCL ES MEGADINE EZ/CLEAN BUTN W/HOLSTR 10FT

## (undated) DEVICE — SYRINGE, LUER LOCK, 60ML: Brand: MEDLINE

## (undated) DEVICE — SKIN PREP TRAY W/CHG: Brand: MEDLINE INDUSTRIES, INC.

## (undated) DEVICE — EXTENSION SET, MALE LUER LOCK ADAPTER WITH RETRACTABLE COLLAR

## (undated) DEVICE — CANN NASL CO2 TRULINK W/O2 A/

## (undated) DEVICE — SUT VIC 3/0 X1 27IN J458H

## (undated) DEVICE — GLV SURG BIOGEL LTX PF 7 1/2

## (undated) DEVICE — DRP C/ARM 41X74IN

## (undated) DEVICE — COTTON BALLS, DOUBLE STRUNG: Brand: DEROYAL

## (undated) DEVICE — ADAPT CLN BIOGUARD AIR/H2O DISP

## (undated) DEVICE — PATIENT RETURN ELECTRODE, SINGLE-USE, CONTACT QUALITY MONITORING, ADULT, WITH 9FT CORD, FOR PATIENTS WEIGING OVER 33LBS. (15KG): Brand: MEGADYNE

## (undated) DEVICE — SNAR POLYP SENSATION STDOVL 27 240 BX40

## (undated) DEVICE — DISPOSABLE BIPOLAR CABLE 12FT. (3.6M): Brand: KIRWAN

## (undated) DEVICE — NEEDLE, QUINCKE 22GX3.5": Brand: MEDLINE INDUSTRIES, INC.

## (undated) DEVICE — DRP MICROSCOPE 4 BINOCULAR CV 54X150IN

## (undated) DEVICE — THE SINGLE USE ETRAP – POLYP TRAP IS USED FOR SUCTION RETRIEVAL OF ENDOSCOPICALLY REMOVED POLYPS.: Brand: ETRAP

## (undated) DEVICE — SENSR O2 OXIMAX FNGR A/ 18IN NONSTR

## (undated) DEVICE — FRCP BX RADJAW4 NDL 2.8 240CM LG OG BX40

## (undated) DEVICE — SPNG GZ WOVN 4X4IN 12PLY 10/BX STRL

## (undated) DEVICE — THE TORRENT IRRIGATION SCOPE CONNECTOR IS USED WITH THE TORRENT IRRIGATION TUBING TO PROVIDE IRRIGATION FLUIDS SUCH AS STERILE WATER DURING GASTROINTESTINAL ENDOSCOPIC PROCEDURES WHEN USED IN CONJUNCTION WITH AN IRRIGATION PUMP (OR ELECTROSURGICAL UNIT).: Brand: TORRENT

## (undated) DEVICE — NEEDLE, QUINCKE, 20GX3.5": Brand: MEDLINE

## (undated) DEVICE — CONN TBG Y 5 IN 1 LF STRL

## (undated) DEVICE — SUT VIC UD BR COAT OS/4 0 27IN SLVR J694H

## (undated) DEVICE — GLV SURG SENSICARE PI LF PF 7.5 GRN STRL

## (undated) DEVICE — KT ORCA ORCAPOD DISP STRL

## (undated) DEVICE — SMOKE EVACUATION TUBING WITH 7/8 IN TO 1/4 IN REDUCER: Brand: BUFFALO FILTER

## (undated) DEVICE — TOOL MR8-14MH30 MR8 14CM MATCH 3MM: Brand: MIDAS REX MR8